# Patient Record
Sex: FEMALE | Race: BLACK OR AFRICAN AMERICAN | Employment: OTHER | URBAN - METROPOLITAN AREA
[De-identification: names, ages, dates, MRNs, and addresses within clinical notes are randomized per-mention and may not be internally consistent; named-entity substitution may affect disease eponyms.]

---

## 2018-10-12 ENCOUNTER — HOSPITAL ENCOUNTER (OUTPATIENT)
Dept: PHYSICAL THERAPY | Age: 28
Discharge: HOME | End: 2018-10-12
Payer: OTHER GOVERNMENT

## 2018-10-12 VITALS — BODY MASS INDEX: 27.96 KG/M2 | WEIGHT: 157.8 LBS | HEIGHT: 63 IN

## 2018-10-12 PROCEDURE — 97162 PT EVAL MOD COMPLEX 30 MIN: CPT

## 2018-10-12 PROCEDURE — 97140 MANUAL THERAPY 1/> REGIONS: CPT

## 2018-10-12 PROCEDURE — 97110 THERAPEUTIC EXERCISES: CPT

## 2018-10-12 NOTE — PROGRESS NOTES
[facility] 
[facility] [address], [address] Teetee Candelario  22. INITIAL EVALUATION 
 
NAME: Albert Pineda AGE: 25 y.o. GENDER: female DATE: 10/12/2018 REFERRING PHYSICIAN: Dr. Eric Constantino HISTORY AND BACKGROUND:  
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 It was intermittent and now has become persistent. She received lymphedema care while she was in [facility] in 2014. She was treated with L thigh high MLB, MLD, exercise, and a pump with both LE garments. She has been receiving 2 pairs of Jobst Ultra Sheer, 20-30 mmHg, large, petite knee high stockings every 6 months from the [place]. Lymphedema Life Impact Scale: Score of 46 and impairment percentage of 68%. See scanned document. Past Medical History: No past medical history on file. No past surgical history on file. Per patient report:  Migraines, back problems, B LE lymphedema grade 4. Current Medications: No current outpatient prescriptions on file. No current facility-administered medications for this encounter. 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 Living Situation: lives alone, family nearby Trainable Caregiver?: family if needed Self-care/ADLs: I Mobility: I Sleeping Arrangement:  Sleeps in a bed Adaptive Equipment Owned: none  Other: n/a 
 Previous Therapy:  2014 in [facility] Compression/Lymphedema Equipment:  Pump, Jobst ultra sheer knee high stockings in CC1, size large, petite SUBJECTIVE:  
Pt reports that her functional level is limited by the sensation of tightness and pain in her legs and feet. Patients goals for therapy: \"to achieve normal size of my limbs and receive training on how to manage this long term. \" OBJECTIVE DATA SUMMARY:  
EXAMINATION/PRESENTATION/DECISION MAKING: Pain: 
Pain Scale 1: Numeric (0 - 10) Pain Intensity 1: 0 Patient Stated Pain Goal: 0 Self-Care and ADLs: 
Grooming: Independent  Bathing: Independent UB Dressing: Independent  LB Dressing: Independent Don/Doff Shoes/Socks: Independent  Toileting: Independent Other: Pt able to manage the pump herself Skin and Tissue Assessment: 
Dermal Status: 
[x]   Intact []  Dry  
[]  Tenuous [] Flaky  
[]  Wound/lesion present []  Scars  
[]  Dermatitis Texture/Consistency: 
[x]  Boggy-B LEs []  Pitting Edema  
[]  Brawny []  Combination []  Fibrotic/Woody Pigmentation/Color Change: 
[x]  Normal []  Hemosiderin  
[]  Red []  Erythematous []  Hyperpigmented []  Hyperlipodermatosclerosis Anomalies: 
[]  Lymphorrhea []  Vesicles []  Petechiae []  Warty Vercusis  
[]  Bullae []  Papilloma Circulatory: 
[]  ISABELLA []  Varicosities:  
[]  Pulses []  Vascular studies ruled out DVT in past  
[x]  Post-phlebitic syndrome Nails: 
[x]   Normal 
[]   Fungus Stemmers Sign: negative B dorsal feet Height:  Height: 5' 3\" (160 cm)  Weight:  Weight: 71.6 kg (157 lb 12.8 oz) BMI:  BMI (calculated): 28 
(36 or greater: adversely affecting lymphedema) Wound/Ulcer:  none      Wound Pain:   n/a Volumetric Measurements:  
Right:  11,152 mL Left:  11,069 mL  
% Difference: .74% Dominance: R  
(See scanned graph) Range of Motion: WNL Strength:  WNL Sensation:   
[x] Intact with some hypersensitivity [] Impaired Mobility:  Bed/Chair Mobility:  Independent Transfers:  Independent Sitting Balance:  normal Standing Balance:  Normal  
Gait:  Independent Wheelchair Mobility: not assessed Endurance:  good Stairs:  Not assessed Safety: 
Patient is alert and oriented:  yes Safety awareness:  good Fall Risk?:  low Patient given written fall prevention handout: Yes Precautions:  Standard lymphedema precautions to include avoiding blood pressure readings, injections and IVs or other procedures/acts that could lead to broken skin on affected area, and avoiding excessive heat, resistive activity or altitude without compression garment Functional Measure:  
Based on the above components, the patient evaluation is determined to be of the following complexity level: MEDIUM Evaluation Time: 8:25-9 am 
 
TREATMENT PROVIDED:  
1. Treatment description:  Therapeutic Exercise and Procedure: Patient instructed in activity restrictions and exercise guidelines. Therapist demonstrated deep abdominal breathing and a remedial lymphedema range of motion exercise program to be done in sitting and supine. Patient was able to complete the routine times 5 reps and deep abdominal breathing with fair performance. Patient has been asked to complete breathing exercises and the decongestive exercise routine daily. Pt was also asked to complete a daily walking program with the goal being up to 30 minutes per day. Treatment time:  9-9:15 am  Minutes: 15 
 
2. Treatment description:  Manual Therapy: Patient instructed in skin care principles and anatomy of the lymphatic system. Therapist able to demonstrate manual lymphatic drainage techniques for the drainage of B LEs and skin care protocol: lotion, heel balm.   Options in compression was discussed and included MLB, day stockings, night foam systems or velcro systems, and a vasopneumatice device that would also drain the trunk as well as the LEs. Pt wore in a pair of her stockings that she received from the [place] but the sizing is not appropriate at this time as she should be wearing a size medium in Jobst brand and not the large. Pt can continue wearing the stocking for now but not sure how much compression it is actually offering the patient. Treatment time:  9:15-9:45 am  Minutes: 30 
 
ASSESSMENT:  
Mike Kilgore is a 25 y.o. female who presents with secondary lymphedema with a possible primary component. Patient will benefit from complete decongestive therapy (CDT) including manual lymphatic drainage (MLD) technique, short-stretch textile bandages/compression system to decongest limb, and kinesiotaping as appropriate. Patient will receive instruction in proper skin care to recognize signs/symptoms of and prevent infection, therapeutic exercise, and self-MLD for independent home program and restorative lymphatic performance. This care is medically necessary due to the infection risk with lymphedema and to improve functional activities. CDT is necessary to resolve swelling to allow patient to return to wearing normal clothes/footwear and prevent worsening of symptoms, such as venous stasis ulcerations, infections, or hospitalizations. Patient will be independent with home program strategies to allow improved ADL ability and mobility and to allow patient to return to greatest functional independence. Rehabilitation potential is considered to be Good. Factors which may influence rehabilitation potential include active lifestyle, age, and her ability to complete skin care and reach her feet and toes. Patient will benefit from 6-12 physical therapy visits over 12 weeks to optimize improvement in these areas. PLAN OF CARE:  
Recommendations and Planned Interventions: 
Manual lymph drainage/complete decongestive therapy Multi-layer compression bandaging (short-stretch) Compression garment fitting/provision Lymphedema therapeutic exercise AROM/PROM/Strength/Coordination Self-care training Functional mobility training Education in skin care and lymphedema precautions Self-MLD education per home program 
Self-bandaging education per home program 
Caregiver education as needed Wound care as needed GOALS Short term goals Time frame: to meet by 11/15/2018 1. Patient will demonstrate knowledge of signs/symptoms of infections/cellulitis and be independent in skin care to prevent cellulitis. 2.  Patient will demonstrate independence in lymphedema home program of therapeutic exercises to improve circulation and decongest limb to improve ADLs. 3.  Patient will tolerate multi-layer bandages (MLB) and show measureable decrease in limb volume to allow ordering of home compression system (daytime, nighttime garments and pump as needed). Long term goals Time frame: to meet by 1/7/2019 1. Pt will show improvement in Lymphedema Life Impact Scale by decreasing impairment percentage to under 60% and thus allow improvement in patient's quality of life. 2.  Patient will be independent with don/doff of compression system and use in order to prevent reaccumulation of fluid at discharge. 3.  Pt will be independent in self-MLD and show stable limb volumes showing decongestion and pt. ready for transition to independent restorative phase of lymphedema therapy. Patient has participated in goal setting and agrees to work toward plan of care. Patient was instructed to call if questions or concerns arise. Thank you for this referral. 
Gurwinder Hernandez, PT Time Calculation: 80 mins TREATMENT PLAN EFFECTIVE DATES:  
10/12/2018 TO 1/7/2019 I have read the above plan of care for Jessica De Jesus. I certify the above prescribed services are required by this patient and are medically necessary.   The above plan of care has been developed in conjunction with the lymphedema/physical therapist.  
 
 
 Physician Signature: ____________________________________Date:______________ Dr. Peg Flood

## 2018-10-25 ENCOUNTER — HOSPITAL ENCOUNTER (OUTPATIENT)
Dept: PHYSICAL THERAPY | Age: 28
Discharge: HOME | End: 2018-10-25
Payer: OTHER GOVERNMENT

## 2018-10-25 PROCEDURE — 97140 MANUAL THERAPY 1/> REGIONS: CPT

## 2018-10-25 NOTE — PROGRESS NOTES
[facility] 
[facility] [address], [address] [place] Candel[place]  20. LYmphedema Therapy Visit: 2 [x]                  Daily note               []                 30 day/10th visit progress note NAME: Charmaine Pineda DATE: 10/25/2018 GOALS Short term goals Time frame: to meet by 11/15/2018 1. Patient will demonstrate knowledge of signs/symptoms of infections/cellulitis and be independent in skin care to prevent cellulitis. Education has begun. Pt has noticed an area on her R medial lower leg that is discolored and thus has done well to keep a close eye on the area for any further changes. 2.  Patient will demonstrate independence in lymphedema home program of therapeutic exercises to improve circulation and decongest limb to improve ADLs. Pt has been educated in the ROM exercise program. 
3.  Patient will tolerate multi-layer bandages (MLB) and show measureable decrease in limb volume to allow ordering of home compression system (daytime, nighttime garments and pump as needed). MLB was deferred prior to ordering the first set of day compression garments as her swelling has been down for the past two appointment because of being here in the early am.  Pt was able to show pictures on her phone of her feet and ankles at the end of the day and the swelling is very visible. Will proceed with ordering the first set of day garments. Have also begun the measuring for custom radha stacy and lower leg piece but would like to use another session to finalize the measurements prior to submitting to the [address] [place] [address] Road term goals Time frame: to meet by 1/7/2019 1. Pt will show improvement in Lymphedema Life Impact Scale by decreasing impairment percentage to under 60% and thus allow improvement in patient's quality of life.  
2.  Patient will be independent with don/doff of compression system and use in order to prevent reaccumulation of fluid at discharge. Pt is I with don and doff of the knee high stockings that she currently has from the [facility] but the stockings are not the appropriate size as they are a Jobst large in the ultra sheer fabric and pt is measuring into the size medium. They are safe for her to wear but they are not containing her at the end of the day. 3.  Pt will be independent in self-MLD and show stable limb volumes showing decongestion and pt. ready for transition to independent restorative phase of lymphedema therapy. Instruction was begun today in the self MLD packet for B LEs, primary sequence. SUBJECTIVE REPORT:  Pt able to perform the ROM remedial lymphedema exercise routine at home following the written handout. She is doing so in the am and she has more time then. She has been asked to try and to in the pm once more swelling has accumulated if she could. Pain: 
Pain Scale 1: Numeric (0 - 10) Pain Intensity 1: 0 Patient Stated Pain Goal: 0 Gait:   
[x]  Independent gait without any assistive device for community distances ADLs:  I Treatment Response:  
[x]   Patient reviewed packet received at evaluation 
[x]   Patient completed home program as prescribed 
[]   Patient not fully compliant with home program to date 
[]   Patient waiting on compression garment arrival 
Function:  
[]   Patient requiring less assistance with completion of home program 
[]   ADLs are requiring less assistance [x]   Patient able to return to work/leisure activities Lymphedema Life Impact Scale: deferred Weight: deferred TREATMENT AND OBJECTIVE DATA SUMMARY:  
Patient/Family Education:  
 
Educated in skin care: [x]   Skin care products [x]   Hygiene [x]  Prevention of cellulitis 
[]   Wound care Educated in exercise: [x]   Walking program 
[]   Polly ball routine 
[]   Stick routine [x]   ROM routine Instructed in self MLD:  
 Written sequence given and reviewed with patient as well as demonstration and instruction during MLD portion of the session. Instructed in don/doff of compression system:  
[]   Multi layer bandage (MLB) donning principles and wear precautions [x]   Day garments-options available 
[x]   Night garments-options available with pt deciding on the flexitouch and radha kumar with lower leg garments Therapeutic Activity 0 minutes Treatment time: n/a Functional Mobility: I Fall risk: low Therapeutic Exercise/Procedure 0 minutes Treatment time: n/a Polly ball exercise program:  deferred Free exercises/ROM: Demonstration by therapist of written routine on the evaluation. Home program: Patient to perform daily to BID: 
[x]   Skin care [x]   Deep abdominal breathing 
[x]   Exercise routine [x]   Walking program 
[x]   Rest in supine  
[]   Compression bandage 
[x]   Compression garments [x]   Vasopneumatic device trial in the clinic hopefully on her next visit  
[]   Wound care [x]   Self MLD 
[]   Bring supplies to each therapy visit 
[]   Purchase necessary supplies 
[]   Weight loss program 
[]   Follow up with Rationale: Exercise will increase the lymph angiomotoricity and tissue pressure of the skin and thus decrease swelling. Modalities 0 minutes Treatment time: n/a Vasopneumatic pump: Pt would benefit from a trial of the flexitouch. The unit was described to pt along with its use and benefits. Pt would like a demo. Manual Lymphatic Drainage (MLD) 90 minutes Treatment time: 8:20-9:50 am 
Area to decongest:   
[] UE 
        []  Right     []  Left      [] Trunk [x] LE   
         [x]  Right     [x]  Left      [x] Trunk Sequence used and effectiveness: [] Secondary/Primary sequence for upper extremity with / without trunk involvement 
 
[x] Primary sequence for lower extremities with trunk involvement completed with full L LE while pt in the supine position today. [] Modifications were made to manual lymph drainage sequence to exclude cervical techniques secondary to patient's age [x] Self MLD sequence/techniques/hand strokes Skin/wound care/debridement: Intact skin except there is an area that was noticed by the patient at home during skin care on the R medial lower leg that is mildly pink in color but that is a raised texture about 2 cms in diameter. Pt has been asked to continue using lotion in the area and to monitor the area. She may need to see a dermatologist or her PCP if it continues. No heat or tenderness noted in the area and pt has not had trauma to the area. Upper/Lower extremity compression: Deferred application of a multilayer compression bandage at this time as pt would benefit from an appropriate day compression garment and then a night foam system. Options were present to patient and she has decided on a pair of knee high stockings and the radha system (stacy and lower leg pieces). Kinesiotaping: deferred Girth/Volume measurement: Reviewed results of volumetric measurements taken on evaluation. Custom measurements were begun for the Radha products but final measurements will be obtained on her next visit prior to submitting the order for processing. TOTAL TREATMENT 90 mins Circumferential Limb Measurements: 
Affected Side: L>R Left (cm) Right (cm) Ankle 24   
 23.2 Calf   
 37   
 38.3 Mid Knee 42   
 42 Thigh 65   
 69.5 Length   
 43.5   
 43.5 ASSESSMENT:  
Treatment effectiveness and tolerance: Pt tolerated first full treatment session of MLD today well. Product selection has begun. Will set up a flexitouch demo for the next visit. Progress toward goals: See goal section for details. PLAN OF CARE:  
Changes to the plan of care: none Frequency: []  2 times a week [x]  Weekly []  Biweekly []  Monthly Other: n/a Deanna Rust PT, [employer]

## 2018-11-01 ENCOUNTER — HOSPITAL ENCOUNTER (OUTPATIENT)
Dept: PHYSICAL THERAPY | Age: 28
Discharge: HOME | End: 2018-11-01
Payer: OTHER GOVERNMENT

## 2018-11-01 PROCEDURE — 97016 VASOPNEUMATIC DEVICE THERAPY: CPT

## 2018-11-01 PROCEDURE — 97140 MANUAL THERAPY 1/> REGIONS: CPT

## 2018-11-01 NOTE — PROGRESS NOTES
[facility] 
Physical Therapy Lymphedema Clinic [address], [address] LYmphedema Therapy Visit: 3 [x]                  Daily note               []                 30 day/10th visit progress note NAME: Armani Pineda DATE: 11/1/2018 GOALS Short term goals Time frame: to meet by 11/15/2018 1. Patient will demonstrate knowledge of signs/symptoms of infections/cellulitis and be independent in skin care to prevent cellulitis. Education continued in skin care. Pt has noticed an area on her R medial lower leg that is discolored and thus has done well to keep a close eye on the area for any further changes. 2.  Patient will demonstrate independence in lymphedema home program of therapeutic exercises to improve circulation and decongest limb to improve ADLs. Pt has been educated in the ROM exercise program. Will progress exercises on upcoming visits. 3.  Patient will tolerate multi-layer bandages (MLB) and show measureable decrease in limb volume to allow ordering of home compression system (daytime, nighttime garments and pump as needed). Deferred MLB. Patient measured for daytime compression garments (ready made knee highs) and also measured for custom radha stacy and lower leg pieces. Patient's order needs to be placed with the prosthetics department at the [address] [place] [address] so order will be placed per protocol and patient to receive the garments and have fitting in our clinic. Patient also able to have her pump trial today of the Flexitouch Plus in the clinic and this information will be sent to [facility] for processing. All garments have been measured for and the ordering process has begun.     
  
Long term goals Time frame: to meet by 1/7/2019 1. Pt will show improvement in Lymphedema Life Impact Scale by decreasing impairment percentage to under 60% and thus allow improvement in patient's quality of life. Deferred 2.  Patient will be independent with don/doff of compression system and use in order to prevent reaccumulation of fluid at discharge. Pt is I with don and doff of the knee high stockings that she currently has from the [place] but the stockings are not the appropriate size as they are a Jobst large in the ultra sheer fabric and pt is measuring into the size medium. They are safe for her to wear but they are not containing her at the end of the day. Will have to assess how she manages all of her new garments when they arrive. 3.  Pt will be independent in self-MLD and show stable limb volumes showing decongestion and pt. ready for transition to independent restorative phase of lymphedema therapy. Instruction continues in the self MLD packet for B LEs, primary sequence. Patient's girths taken today. Will continue to monitor volumes. SUBJECTIVE REPORT:  Patient able to be measured for all of her compression garments and have a vaso-pneumatic pump trial today. Pain:0/10 Gait:   
[x]  Independent gait without any assistive device for community distances ADLs:  I Treatment Response:  
[x]   Patient reviewed packet received at evaluation 
[x]   Patient completed home program as prescribed 
[]   Patient not fully compliant with home program to date [x]   Patient measured for compression garments today Function:  
[]   Patient requiring less assistance with completion of home program 
[]   ADLs are requiring less assistance [x]   Patient able to return to work/leisure activities Lymphedema Life Impact Scale: deferred Weight: deferred TREATMENT AND OBJECTIVE DATA SUMMARY:  
Patient/Family Education:  
 
Educated in skin care: [x]   Skin care products [x]   Hygiene [x]  Prevention of cellulitis 
[]   Wound care Educated in exercise: [x]   Walking program 
[]   Polly ball routine 
[]   Stick routine [x]   ROM routine Instructed in self MLD:  
 Written sequence given and reviewed with patient as well as demonstration and instruction during MLD portion of the session. Instructed in don/doff of compression system:  
[]   Multi layer bandage (MLB) donning principles and wear precautions [x]   Day garments-Jobst and Sigvaris knee highs will be ordered. See paper chart for details. [x]   Night garments-options available with pt deciding on the flexitouch and radha kumar with lower leg garments See paper chart for details. Therapeutic Activity 0 minutes Treatment time: n/a Functional Mobility: I Fall risk: low Therapeutic Exercise/Procedure0 minutes Treatment time: n/a Actual Experience exercise program: Deferred Free exercises/ROM: Deferred. Patient has written exercises to follow in the home. Home program: Patient to perform daily to BID: 
[x]   Skin care [x]   Deep abdominal breathing 
[x]   Exercise routine [x]   Walking program 
[x]   Rest in supine  
[]   Compression bandage 
[x]   Compression garments [x]   Vasopneumatic device trial in the clinic today 
[]   Wound care [x]   Self MLD [x]   Bring supplies to each therapy visit 
[]   Purchase necessary supplies 
[]   Weight loss program 
[]   Follow up with Rationale: Exercise will increase the lymph angiomotoricity and tissue pressure of the skin and thus decrease swelling. Modalities 60 minutes Treatment time: 8:30am-9:30am 
Vasopneumatic pump: Patient was able to have a full Flexitouch Plus trial today in the clinic for the L LE and Trunk. Patient tolerated the treatment well and had good results. Patient would like to proceed with ordering a vaso-pneumatic pump for home use. Manual Lymphatic Drainage (MLD) 70 minutes Treatment time: 5:71SE-8:64HP and 9:30am-10:15am 
Area to decongest:   
[] UE 
        []  Right     []  Left      [] Trunk [x] LE   
         [x]  Right     [x]  Left      [x] Trunk Sequence used and effectiveness: [] Secondary/Primary sequence for upper extremity with / without trunk involvement 
 
[x] Primary sequence for lower extremities with trunk involvement 
 
[] Modifications were made to manual lymph drainage sequence to exclude cervical techniques secondary to patient's age [x] Self MLD sequence/techniques/hand strokes Skin/wound care/debridement: Intact skin except there is an area that was noticed by the patient at home during skin care on the R medial lower leg that is mildly pink in color but that is a raised texture about 2 cms in diameter. Pt has been asked to continue using lotion in the area and to monitor the area. She may need to see a dermatologist or her PCP if it continues. No heat or tenderness noted in the area and pt has not had trauma to the area. Upper/Lower extremity compression: Deferred application of a multilayer compression bandage at this time as pt would benefit from an appropriate day compression garment and  night foam system. Options were present to patient and she has decided on a pair of knee high stockings and the radha system (stacy and lower leg pieces). All measurements were taken today and the order will be processed through the prosthetics department at the [address] [place] [address]. Kinesiotaping: Deferred Girth/Volume measurement: Girths taken today pre/post vaso-pneumatic pump trial. See below. TOTAL TREATMENT 130 mins Circumferential Limb Measurements: 
Affected Side: L>R Left (cm) Left  (cm) post pump trial  
Ankle 24.0   
23.8 Calf 38.0    
37.0 Mid Knee 41.8   
38.7 Thigh 54.0   
 54.7 Waist/Hips 80.5/113.0   
79.0/112.0 ASSESSMENT:  
Treatment effectiveness and tolerance: Patient able to have pump trial and have all garments measured today. [facility].  Patient pleased to be able to receive the care that she needs to address her lymphedema. Will continue to educate in home program on next visit. Progress toward goals: See goal section for details. PLAN OF CARE:  
Changes to the plan of care: Continue with plan of care established on evaluation. Frequency: []  2 times a week [x]  Weekly []  Biweekly []  Monthly Other: Will submit order to prosthetics at the [place] per protocol.    
 
 
Rodrigo Bateman, PTA, CLT

## 2018-11-08 ENCOUNTER — HOSPITAL ENCOUNTER (OUTPATIENT)
Dept: PHYSICAL THERAPY | Age: 28
Discharge: HOME | End: 2018-11-08
Payer: OTHER GOVERNMENT

## 2018-11-08 PROCEDURE — 97110 THERAPEUTIC EXERCISES: CPT

## 2018-11-08 PROCEDURE — 97140 MANUAL THERAPY 1/> REGIONS: CPT

## 2018-11-08 NOTE — PROGRESS NOTES
[facility] 
Physical Therapy Lymphedema Clinic [address] [place] [address], [address] LYmphedema Therapy Visit: 4 [x]                  Daily note               []                 30 day/10th visit progress note NAME: George Pineda DATE: 11/8/2018 GOALS Short term goals Time frame: to meet by 11/15/2018 1. Patient will demonstrate knowledge of signs/symptoms of infections/cellulitis and be independent in skin care to prevent cellulitis. Education is complete in skin care. Pt has noticed an area on her R medial lower leg that is pink and thus has been applying more lotion. She feels that is is a dry area that is responding to the lotion. 2.  Patient will demonstrate independence in lymphedema home program of therapeutic exercises to improve circulation and decongest limb to improve ADLs. Pt has been educated in the ROM exercise program and today in the [place] casey ball routine. 3.  Patient will tolerate multi-layer bandages (MLB) and show measureable decrease in limb volume to allow ordering of home compression system (daytime, nighttime garments and pump as needed). Deferred MLB. Patient measured for daytime compression garments (ready made knee highs) and also measured for custom radha stacy and lower leg pieces. Patient's order placed with the prosthetics department at the [facility] via her PCP at the [facility]. Patient completed her pump trial last visit of the Flexitouch Plus in the clinic and this information and thus awaiting the processing of that order as well. 
  
Long term goals Time frame: to meet by 1/7/2019 1. Pt will show improvement in Lymphedema Life Impact Scale by decreasing impairment percentage to under 60% and thus allow improvement in patient's quality of life. Pt scored 23 with 34% impairment and thus reach goal today, 11/8/18.  
2.  Patient will be independent with don/doff of compression system and use in order to prevent reaccumulation of fluid at discharge. Pt is I with don and doff of the knee high stockings that she currently has from the [place] but the stockings are not the appropriate size as they are a Jobst large in the ultra sheer fabric and pt is measuring into the size medium. They are safe for her to wear but they are not containing her at the end of the day. Will have to assess how she manages all of her new garments when they arrive. 3.  Pt will be independent in self-MLD and show stable limb volumes showing decongestion and pt. ready for transition to independent restorative phase of lymphedema therapy. Instruction continues in the self MLD packet for B LEs, primary sequence. Volumes are higher today as pt needs compression garments along with the MLD. SUBJECTIVE REPORT:  [facility] and [facility]. Pt is feeling some discomfort in her legs that is fairly new for her. Pain:1/10, very mild discomfort in her lower legs today. Gait:   
[x]  Independent gait without any assistive device for community distances ADLs:  I Treatment Response:  
[x]   Patient reviewed packet received at evaluation 
[x]   Patient completed home program as prescribed 
[]   Patient not fully compliant with home program to date [x]   Patient measured for compression garments and the order has been sent to the [place] Function:  
[]   Patient requiring less assistance with completion of home program 
[]   ADLs are requiring less assistance [x]   Patient able to return to work/leisure activities Lymphedema Life Impact Scale: Pt scored 23 with 34% today. Weight: deferred TREATMENT AND OBJECTIVE DATA SUMMARY:  
Patient/Family Education:  
 
Educated in skin care: [x]   Skin care products [x]   Hygiene [x]  Prevention of cellulitis 
[]   Wound care Educated in exercise: [x]   Walking program 
[x]   Polly ball routine 
[]   Stick routine [x]   ROM routine Instructed in self MLD:  
Demonstration and instruction during MLD portion of the session. Instructed in don/doff of compression system:  
[]   Multi layer bandage (MLB) donning principles and wear precautions [x]   Day garments-Jobst and Sigvaris knee highs will be ordered. See paper chart for details. [x]   Night garments-options available with pt deciding on the flexitouch and radha kumar with lower leg garments See paper chart for details. Therapeutic Activity 0 minutes Treatment time: n/a Functional Mobility: I Fall risk: low Therapeutic Exercise/Procedure 30 minutes Treatment time: 9:10-9:40 am 
[employer] exercise program: Pt instructed in the [facility] [employer] exercise program today to be done in the supine and sitting postures. Pt able to perform 5 reps of each. Free exercises/ROM:  Patient has written ROM exercises to follow in the home and has been encouraged to walk daily for 30 minutes. Pt performed additional ROM of her LEs for therapist to take volumetric measurements of each LE. Home program: Patient to perform daily to BID: 
[x]   Skin care [x]   Deep abdominal breathing 
[x]   Exercise routine [x]   Walking program 
[x]   Rest in supine  
[]   Compression bandage 
[x]   Compression garments [x]   Vasopneumatic device trial in the clinic today 
[]   Wound care [x]   Self MLD [x]   Bring supplies to each therapy visit 
[]   Purchase necessary supplies 
[]   Weight loss program 
[]   Follow up with Rationale: Exercise will increase the lymph angiomotoricity and tissue pressure of the skin and thus decrease swelling. Modalities 0 minutes Treatment time: n/a Vasopneumatic pump: Patient completed the Flexitouch Plus trial in the clinic for the L LE and Trunk last week. Patient tolerated the treatment well and had good results. Patient is hoping to get one in the home. Manual Lymphatic Drainage (MLD) 60 minutes Treatment time: 8:10-9:10am 
Area to decongest:  
[x] LE   
         [x]  Right     [x]  Left      [x] Trunk Sequence used and effectiveness:  
 
[x] Primary sequence for lower extremities with trunk involvement 
 
[] Modifications were made to manual lymph drainage sequence to exclude cervical techniques secondary to patient's age [x] Self MLD sequence/techniques/hand strokes Skin/wound care/debridement: Intact skin except there for the area on the R medial lower leg that is mildly pink in color but no longer with a raised texture. It covers about a 2 cm diameter but there are areas in the middle that are not involved. Pt has been asked to continue using lotion in the area and to monitor the area. She may need to see a dermatologist or her PCP if it continues. No heat or tenderness noted in the area and pt has not had trauma to the area. Upper/Lower extremity compression: Deferred application of a multilayer compression bandage at this time as pt's compression day and night garments have been ordered through her PCP and subsequently thru the prosthetics department at the [place]. Pt continues to wear the jobst size large, sheer knee highs at this time. Kinesiotaping: Deferred Girth/Volume measurement: Volumes taken today and reveal gain of 661 ml in the R LE and a gain of 404 ml in the L LE since the evaluation on 10/12/18. TOTAL TREATMENT 90 mins Circumferential Limb Measurements: 
See volume section above. ASSESSMENT:  
Treatment effectiveness and tolerance: Patient is ready for day and night compression garments and also a vasopneumatic device in the home setting. Hoping all can be processed quickly so that pt can begin with compression on her LEs. Progress toward goals: See goal section for details. LTG 1 met today. PLAN OF CARE:  
Changes to the plan of care: Continue with plan of care established on evaluation. Frequency: []  2 times a week 
[]  Weekly [x]  Biweekly []  Monthly Other: Order to prosthetics at the [place] has been submitted and pt will follow up with a phone call. Aleksandr Mejia, PT, [employer]

## 2018-11-20 ENCOUNTER — HOSPITAL ENCOUNTER (OUTPATIENT)
Dept: PHYSICAL THERAPY | Age: 28
Discharge: HOME | End: 2018-11-20
Payer: OTHER GOVERNMENT

## 2018-11-20 PROCEDURE — 97140 MANUAL THERAPY 1/> REGIONS: CPT

## 2018-11-20 NOTE — PROGRESS NOTES
[facility] 
Physical Therapy Lymphedema Clinic [address], [address] [facility], [address] LYmphedema Therapy Visit: 5 [x]                  Daily note               []                 30 day/10th visit progress note NAME: Vimal Pineda DATE: 11/20/2018 GOALS Short term goals Time frame: to meet by 11/15/2018 1. Patient will demonstrate knowledge of signs/symptoms of infections/cellulitis and be independent in skin care to prevent cellulitis. Education is complete in skin care. Pt has noticed an area on her R medial lower leg that is pink and thus has been applying more lotion. She feels that is is a dry area that is responding to the lotion. Will continue to monitor. 2.  Patient will demonstrate independence in lymphedema home program of therapeutic exercises to improve circulation and decongest limb to improve ADLs. Patient has been educated in the ROM exercise program and knobbie casey ball routine to date. She is independent with her written handouts. Goal Met 11/20/18 3. Patient will tolerate multi-layer bandages (MLB) and show measureable decrease in limb volume to allow ordering of home compression system (daytime, nighttime garments and pump as needed). Deferred MLB. Patient measured for daytime compression garments (ready made knee highs) and also measured for custom radha stacy and lower leg pieces. Patient's order placed with the prosthetics department at the [facility] via her PCP at the [facility]. Patient has yet to receive her garments so [facility] and [facility] were contacted by primary therapist to try to find out why the order has not been placed. Patient reports that her Flexitouch Plus will arrive today and she will be trained on Monday. All products have been ordered, but have not arrived to date. Will continue to monitor.  
  
Long term goals Time frame: to meet by 1/7/2019 1.  Pt will show improvement in Lymphedema Life Impact Scale by decreasing impairment percentage to under 60% and thus allow improvement in patient's quality of life. Pt scored 23 with 34% impairment. Goal met 11/8/18. 2.  Patient will be independent with don/doff of compression system and use in order to prevent reaccumulation of fluid at discharge. Pt is I with don and doff of the knee high stockings that she currently has from the [place] but the stockings are not the appropriate size as they are a Jobst large in the ultra sheer fabric and pt is measuring into the size medium. They are safe for her to wear but they are not containing her at the end of the day. Will have to assess how she manages all of her new garments when they arrive. 3.  Pt will be independent in self-MLD and show stable limb volumes showing decongestion and pt. ready for transition to independent restorative phase of lymphedema therapy. Instruction continues in the self MLD packet for B LEs, primary sequence. Volumes not assessed today because patient did not wear in her knee highs and she does not have her new garments. Will assess next visit. SUBJECTIVE REPORT:  Patient called her PCP  to assist with processing the order for compression garments after last visit and from her understanding everything was taken care of and order placed. [facility] and call placed to referring physician to try to find out when patient will receive her garments. There has been some barriers with this patient's garment order which is delaying her progress towards goals. Patient reports that she will be traveling to [place] by car for Thanksgiving. Discussed strategies to reduce swelling with travel. \"My left foot/ankle usually gets really swollen when I travel. \" 
Patient agreeable to try kinesio-taping today to see if this well help. Pain:0/10 Gait:   
 [x]  Independent gait without any assistive device for community distances ADLs:  I Treatment Response:  
[x]   Patient reviewed packet received at evaluation 
[x]   Patient completed home program as prescribed 
[]   Patient not fully compliant with home program to date [x]   Patient measured for compression garments and the order has been sent to the [place], there has been a delay in order being processed so [facility] and [facility] office called by primary therapist.  
Function:  
[]   Patient requiring less assistance with completion of home program 
[]   ADLs are requiring less assistance [x]   Patient able to return to work/leisure activities Lymphedema Life Impact Scale: Pt scored 23 with 34% previously. Goal Met. Weight: Deferred TREATMENT AND OBJECTIVE DATA SUMMARY:  
Patient/Family Education:  
 
Educated in skin care: [x]   Skin care products [x]   Hygiene [x]  Prevention of cellulitis 
[]   Wound care Educated in exercise: [x]   Walking program 
[x]   Casey ball routine 
[]   Stick routine [x]   ROM routine Instructed in self MLD: Demonstration and instruction during MLD portion of the session. Patient has written handouts to follow. Instructed in don/doff of compression system:  
[]   Multi layer bandage (MLB) donning principles and wear precautions [x]   Day garments-Jobst and Sigvaris knee highs will be ordered. See paper chart for details. [x]   Night garments-options available with pt deciding on the flexitouch and radha stacy with lower leg garments See paper chart for details. Therapeutic Activity 0 minutes Treatment time: n/a Functional Mobility: Independent Fall risk: Low Therapeutic Exercise/Procedure0 minutes Treatment time: 0 Casey ball exercise program: Pt instructed in the roberto casey ball exercise program today to be done in the supine and sitting postures. Pt able to perform 5 reps of each. Free exercises/ROM: Patient has written ROM exercises to follow in the home and has been encouraged to walk daily for 30 minutes. Pt performed additional ROM of her LEs for therapist to take volumetric measurements of each LE. Home program: Patient to perform daily to BID: 
[x]   Skin care [x]   Deep abdominal breathing 
[x]   Exercise routine [x]   Walking program 
[x]   Rest in supine  
[]   Compression bandage 
[x]   Compression garments [x]   Vasopneumatic device trial in the clinic today 
[]   Wound care [x]   Self MLD [x]   Bring supplies to each therapy visit 
[]   Purchase necessary supplies 
[]   Weight loss program 
[]   Follow up with Rationale: Exercise will increase the lymph angiomotoricity and tissue pressure of the skin and thus decrease swelling. Modalities 0 minutes Treatment time: n/a Vasopneumatic pump: Patient reports that her Flexitouch Plus vaso-pneumatic pump is scheduled to arrive today. Patient set up for training on Monday. Manual Lymphatic Drainage (MLD)  80 minutes Treatment time: 11:10am-12:30pm 
Area to decongest:  
[x] LE   
         [x]  Right     [x]  Left      [x] Trunk Sequence used and effectiveness:  
[x] Primary sequence for lower extremities with trunk involvement 
 
[] Modifications were made to manual lymph drainage sequence to exclude cervical techniques secondary to patient's age [x] Self MLD sequence/techniques/hand strokes Patient has written handout to follow. Skin/wound care/debridement: Intact skin except there for the area on the R medial lower leg that is mildly pink in color but no longer with a raised texture. It covers about a 2 cm diameter but there are areas in the middle that are not involved. Pt has been asked to continue using lotion in the area and to monitor the area. She may need to see a dermatologist or her PCP if it continues. No heat or tenderness noted in the area and pt has not had trauma to the area. Upper/Lower extremity compression: Deferred application of a multilayer compression bandage at this time as pt's compression day and night garments have been ordered through her PCP and subsequently thru the prosthetics department at the [place]. [facility] and [facility] contacted today to find out about patient's order as garments were ordered several weeks ago. Patient has also contacted her PCP and is frustrated that there as been a delay. At this time we are having to wait for order to be filled and have very little control of order due to the process that needs to be done with insurance provider. Patient encouraged to continue with her home program, utilize her knee high compression and compression capris for her trip and if needed use kinesiotape that was provided to her today. Pt continues to wear the Jobst size large, sheer knee highs at this time, but did not have her garment on today when she arrived. Kinesiotaping: Patient reports that her L foot and ankle swell when she travels even with compression knee highs. Patient reports she does not have an allergy to taping material and agreeable to try the kinesio-tape for her trip. Two \"y\" strips were placed from base of toes to lateral lower leg at the knee and then one additional piece at the upper thigh medical to lateral. Patient instructed on tape use and how to remove safely. Patient given several pieces to use for her trip as needed. Girth/Volume measurement: Deferred today. TOTAL TREATMENT 80 mins Circumferential Limb Measurements: 
Deferred today. ASSESSMENT:  
Treatment effectiveness and tolerance: Patient is ready for day and night compression garments but there has been a delay with the process as we need to go through [place] prosthetics department to have order placed. Patient needs these products as soon as possible to reduce her swelling and to work toward her goals.  Will continue to work with [place] prosthetics and PCP on getting these garments to patient. Patient aware to contact the clinic when she receives her products for fitting/follow up. Patient plans to have her vaso-pneumatic pump delivered today and her training will occur on Monday. Patient traveling for the holiday so concerned that her swelling will worsen. She is aware to contact the clinic if needed to make an appointment if her swelling is worse when she returns home. Progress toward goals: See goal section for details. STG 2 and LTG 1 met. Will continue with remaining goals. PLAN OF CARE:  
Changes to the plan of care: Continue with plan of care established on evaluation. Frequency: []  2 times a week 
[]  Weekly [x]  Biweekly or when her garments arrive. []  Monthly Other: Order to prosthetics at the [place] has been submitted and pt followed up with a phone call, but the order has not been filled or placed. Primary therapist contacted both the PCP and [place] regarding this.    
 
 
Rodrigo Bateman, PTA, CLT

## 2018-12-11 ENCOUNTER — HOSPITAL ENCOUNTER (OUTPATIENT)
Dept: PHYSICAL THERAPY | Age: 28
Discharge: HOME | End: 2018-12-11
Payer: OTHER GOVERNMENT

## 2018-12-11 PROCEDURE — 97140 MANUAL THERAPY 1/> REGIONS: CPT

## 2018-12-11 NOTE — PROGRESS NOTES
[facility]  Physical Therapy Lymphedema Clinic  [address] [facility], [address]    LYmphedema Therapy  Visit: 6    []                  Daily note               [x]                 30 day Reassessment/ Progress note    NAME: Zulma Pineda  DATE: 12/11/2018    GOALS  Short term goals met:  2. Patient will demonstrate independence in lymphedema home program of therapeutic exercises to improve circulation and decongest limb to improve ADLs. Patient has been educated in the ROM exercise program and knobbie casey ball routine to date. She is independent with her written handouts. Goal Met 11/20/18  1. Patient will demonstrate knowledge of signs/symptoms of infections/cellulitis and be independent in skin care to prevent cellulitis. Patient has been educated in skin care and infection/cellulitis prevention. Patient independent with skin care at this time. Goal Met 12/11/18    Short term goals to meet by 11/15/2018 Extended to 1/7/19:  3. Patient will tolerate multi-layer bandages (MLB) and show measureable decrease in limb volume to allow ordering of home compression system (daytime, nighttime garments and pump as needed). Deferred MLB. Patient measured for daytime compression garments (ready made knee highs) and also measured for custom radha stacy and lower leg pieces. Patient received her garments and they were fitted today. Initial fit is good and patient comfortable with her products. Patient received her Flexitouch Plus and training has occurred in the home. Will measure volumes next week after she has been able to work with all of her new products. Continue.     Long term goals met:  1. Pt will show improvement in Lymphedema Life Impact Scale by decreasing impairment percentage to under 60% and thus allow improvement in patient's quality of life. Pt scored 23 with 34% impairment. Goal met 11/8/18.   2.  Patient will be independent with don/doff of compression system and use in order to prevent reaccumulation of fluid at discharge. Patient able today to demonstrate that she could don/doff all her new day and night compression systems. Patient purchased donning gloves to assist with donning knee highs. Goal Met 12/11/18    Long term goals to meet by 1/7/2019:  3. Pt will be independent in self-MLD and show stable limb volumes showing decongestion and pt. ready for transition to independent restorative phase of lymphedema therapy. Patient has been educated in self MLD packet for B LEs, primary sequence. Volumes not assessed today because patient just received all of her products. Will have patient work with all of her products and return in one week to have them assessed. SUBJECTIVE REPORT:  Patient received all of her day and night products from the [place]. Patient able to be fitted with all today and she is pleased with her products. Patient reports that she did not swell as much as she usually does on her trip to [place] at [facility] and can see the benefit of CDT that she has learned so far. Patient eager to begin using all of her new products. Pain:0/10  Gait:    [x]  Independent gait without any assistive device for community distances  ADLs:  I  Treatment Response:   [x]   Patient reviewed packet received at evaluation  [x]   Patient completed home program as prescribed  []   Patient not fully compliant with home program to date  [x]   Patient fitted with her new compression day and night time products. Function:   []   Patient requiring less assistance with completion of home program  []   ADLs are requiring less assistance   [x]   Patient able to return to work/leisure activities  Lymphedema Life Impact Scale: Pt scored 7 with 10% impairment on the LLIS. Initial evaluation score on 10/12/18 was 46 with a 68% impairment.    Weight: Deferred  TREATMENT AND OBJECTIVE DATA SUMMARY:   Patient/Family Education:      Educated in skin care: [x]   Skin care products  [x]   Hygiene  [x]  Prevention of cellulitis  []   Wound care     Educated in exercise: [x]   Walking program  [x]   Polly ball routine  []   Stick routine  [x]   ROM routine     Instructed in self MLD: Demonstration and instruction during MLD portion of the session. Patient has written handouts to follow. Instructed in don/doff of compression system:   []   Multi layer bandage (MLB) donning principles and wear precautions  [x]   Day garments- Received Jobst and Sigvaris knee highs and educated in wear and care of garments. [x]   Night garments- Received Flexitouch and radha stacy with lower leg garments and educated in wear and care of garments. Therapeutic Activity 0 minutes   Treatment time: n/a  Functional Mobility: Independent   Fall risk: Low     Therapeutic Exercise/Procedure 0 minutes   Treatment time: 0  [employer] exercise program: Patient has been educated in the [employer] routine on previous visits. Free exercises/ROM: Patient has written ROM exercises to follow in the home and has been encouraged to walk daily for 30 minutes. Home program: Patient to perform daily to BID:  [x]   Skin care  [x]   Deep abdominal breathing  [x]   Exercise routine  [x]   Walking program  [x]   Rest in supine   []   Compression bandage  [x]   Compression garments wear day and night products as recommended  [x]   Vasopneumatic device using daily in the home  []   Wound care  [x]   Self MLD  [x]   Bring supplies to each therapy visit  []   Purchase necessary supplies  []   Weight loss program  []   Follow up with    Rationale: Exercise will increase the lymph angiomotoricity and tissue pressure of the skin and thus decrease swelling. Modalities 0 minutes   Treatment time: n/a    Vasopneumatic pump: Patient now with her Flexitouch Plus vaso-pneumatic pump in place at home and is using daily as recommended.      Manual Lymphatic Drainage (MLD)  80 minutes   Treatment time: 12:35-1:30  Area to decongest:   [x] LE             [x]  Right     [x]  Left      [x] Trunk     Sequence used and effectiveness:   [x] Primary sequence for lower extremities with trunk involvement    [] Modifications were made to manual lymph drainage sequence to exclude cervical techniques secondary to patient's age    [x] Self MLD sequence/techniques/hand strokes  Patient has written handout to follow. Skin/wound care/debridement: Intact today. Some dryness noted lower legs due to weather conditions. Upper/Lower extremity compression: Deferred application of a multilayer compression bandage at this time as pt's compression day and night garments were ordered through her PCP and subsequently thru the prosthetics department at the [place]. Patient wore in her new Jobst Opaque Knee Highs 20-30 mmHg in black size M. Patient has not washed garments to date so they presented as slightly long. Discussed daily laundering of garments in order to keep their shape. Patient was fitted with all of her Custom Jeancarlos Oleg Items: Custom Boxer Azucena and RLE and LLE AD garments with Jeancarlos jackets for all. Patient educated on the wear and care of the products and also how to utilize the garments in the beginning to build tolerance. Patient able to demonstrate that she could don and doff independently and was issues dycem for home use. Patient was fitted with her Sigvaris 842C Soft Opaque  knee highs 20-30 mmHg in Medium/ Color Expresso. She reports that she feels more comfortable in these knee highs so we will see how she does over the next week and if additional knee highs can be ordered she may prefer these. Educated patient on how to care for her knee highs and wearing schedule. Encouraged her to alterate garments daily. Patient also shown the benefit of donning gloves and she was able to purchase a pair from [employer] today. Kinesiotaping: Deferred today.     Girth/Volume measurement: Deferred today and will assess when she returns next week after wearing her day and night garments for one week. TOTAL TREATMENT 55 mins     Circumferential Limb Measurements:  Deferred today. ASSESSMENT:   Treatment effectiveness and tolerance: Patient now has received all of her day and night products as well as her Flexitouch Plus vaso-pneumatic pump. The initial fit of all of the items is good. Patient to work with the products for one week and then return for volumes to be assessed and to see if she is having any issues/concerns. Expect that if patient is doing well we can proceed in trying to order her additional knee highs if she qualifies and prepare her for the restorative phase of care. There were issues with delay in receiving her garments, but now that she has them expect that she will meet remaining goals in the next 1-2 visits. Progress toward goals: See goal section for details. STG 1+ 2 and LTG 1+2 met. Will continue with remaining goals. PLAN OF CARE:   Changes to the plan of care: Continue with plan of care established on evaluation.     Frequency: []  2 times a week  [x]  Weekly  []  Biweekly   []  Monthly   Other:        Rodrigo NEWBY Patch, PTA, CLT  Rishi [employer], PT, [employer]

## 2019-05-07 NOTE — PROGRESS NOTES
Patient was treated in the [address] [place] [address] from 10/12/2018 - 2018. Patient received her compression garments and the vaso-pneumatic pump from the vendors but did not follow up for the fit and effectiveness of the garments and pump to be assessed. Since patient's plan of care  on 2019, she will be discharged from Lymphedema Services at this time.

## 2021-09-16 ENCOUNTER — APPOINTMENT (OUTPATIENT)
Dept: PHYSICAL THERAPY | Age: 31
End: 2021-09-16

## 2021-10-12 ENCOUNTER — HOSPITAL ENCOUNTER (EMERGENCY)
Age: 31
Discharge: HOME | End: 2021-10-12
Attending: EMERGENCY MEDICINE
Payer: OTHER GOVERNMENT

## 2021-10-12 VITALS
BODY MASS INDEX: 32.07 KG/M2 | OXYGEN SATURATION: 99 % | SYSTOLIC BLOOD PRESSURE: 119 MMHG | TEMPERATURE: 99 F | HEIGHT: 63 IN | HEART RATE: 110 BPM | RESPIRATION RATE: 18 BRPM | DIASTOLIC BLOOD PRESSURE: 72 MMHG | WEIGHT: 181 LBS

## 2021-10-12 DIAGNOSIS — R10.84 ABDOMINAL PAIN, GENERALIZED: Primary | ICD-10-CM

## 2021-10-12 DIAGNOSIS — R19.7 DIARRHEA OF PRESUMED INFECTIOUS ORIGIN: ICD-10-CM

## 2021-10-12 LAB
ALBUMIN SERPL-MCNC: 3 G/DL (ref 3.5–5)
ALBUMIN/GLOB SERPL: 0.6 {RATIO} (ref 1.1–2.2)
ALP SERPL-CCNC: 93 U/L (ref 45–117)
ALT SERPL-CCNC: 18 U/L (ref 12–78)
ANION GAP SERPL CALC-SCNC: 7 MMOL/L (ref 5–15)
APPEARANCE UR: ABNORMAL
AST SERPL-CCNC: 16 U/L (ref 15–37)
BACTERIA URNS QL MICRO: ABNORMAL /HPF
BASOPHILS # BLD: 0 K/UL (ref 0–0.1)
BASOPHILS NFR BLD: 0 % (ref 0–1)
BILIRUB SERPL-MCNC: 0.3 MG/DL (ref 0.2–1)
BILIRUB UR QL: NEGATIVE
BUN SERPL-MCNC: 11 MG/DL (ref 6–20)
BUN/CREAT SERPL: 13 (ref 12–20)
CALCIUM SERPL-MCNC: 8.7 MG/DL (ref 8.5–10.1)
CHLORIDE SERPL-SCNC: 104 MMOL/L (ref 97–108)
CO2 SERPL-SCNC: 28 MMOL/L (ref 21–32)
COLOR UR: ABNORMAL
CREAT SERPL-MCNC: 0.85 MG/DL (ref 0.55–1.02)
DIFFERENTIAL METHOD BLD: NORMAL
EOSINOPHIL # BLD: 0.1 K/UL (ref 0–0.4)
EOSINOPHIL NFR BLD: 2 % (ref 0–7)
EPITH CASTS URNS QL MICRO: ABNORMAL /LPF
ERYTHROCYTE [DISTWIDTH] IN BLOOD BY AUTOMATED COUNT: 13.6 % (ref 11.5–14.5)
GLOBULIN SER CALC-MCNC: 4.9 G/DL (ref 2–4)
GLUCOSE SERPL-MCNC: 80 MG/DL (ref 65–100)
GLUCOSE UR STRIP.AUTO-MCNC: NEGATIVE MG/DL
HCT VFR BLD AUTO: 40.6 % (ref 35–47)
HGB BLD-MCNC: 13.5 G/DL (ref 11.5–16)
HGB UR QL STRIP: NEGATIVE
IMM GRANULOCYTES # BLD AUTO: 0 K/UL (ref 0–0.04)
IMM GRANULOCYTES NFR BLD AUTO: 0 % (ref 0–0.5)
KETONES UR QL STRIP.AUTO: NEGATIVE MG/DL
LEUKOCYTE ESTERASE UR QL STRIP.AUTO: ABNORMAL
LIPASE SERPL-CCNC: 86 U/L (ref 73–393)
LYMPHOCYTES # BLD: 1.6 K/UL (ref 0.8–3.5)
LYMPHOCYTES NFR BLD: 24 % (ref 12–49)
MCH RBC QN AUTO: 29.4 PG (ref 26–34)
MCHC RBC AUTO-ENTMCNC: 33.3 G/DL (ref 30–36.5)
MCV RBC AUTO: 88.5 FL (ref 80–99)
MONOCYTES # BLD: 0.7 K/UL (ref 0–1)
MONOCYTES NFR BLD: 10 % (ref 5–13)
NEUTS SEG # BLD: 4.4 K/UL (ref 1.8–8)
NEUTS SEG NFR BLD: 64 % (ref 32–75)
NITRITE UR QL STRIP.AUTO: NEGATIVE
NRBC # BLD: 0 K/UL (ref 0–0.01)
NRBC BLD-RTO: 0 PER 100 WBC
PH UR STRIP: 6.5 [PH] (ref 5–8)
PLATELET # BLD AUTO: 224 K/UL (ref 150–400)
PMV BLD AUTO: 9.8 FL (ref 8.9–12.9)
POTASSIUM SERPL-SCNC: 3.4 MMOL/L (ref 3.5–5.1)
PROT SERPL-MCNC: 7.9 G/DL (ref 6.4–8.2)
PROT UR STRIP-MCNC: NEGATIVE MG/DL
RBC # BLD AUTO: 4.59 M/UL (ref 3.8–5.2)
RBC #/AREA URNS HPF: ABNORMAL /HPF (ref 0–5)
SODIUM SERPL-SCNC: 139 MMOL/L (ref 136–145)
SP GR UR REFRACTOMETRY: 1.03 (ref 1–1.03)
UA: UC IF INDICATED,UAUC: ABNORMAL
UROBILINOGEN UR QL STRIP.AUTO: 1 EU/DL (ref 0.2–1)
WBC # BLD AUTO: 6.9 K/UL (ref 3.6–11)
WBC URNS QL MICRO: ABNORMAL /HPF (ref 0–4)

## 2021-10-12 PROCEDURE — 83690 ASSAY OF LIPASE: CPT

## 2021-10-12 PROCEDURE — 96374 THER/PROPH/DIAG INJ IV PUSH: CPT

## 2021-10-12 PROCEDURE — 99283 EMERGENCY DEPT VISIT LOW MDM: CPT

## 2021-10-12 PROCEDURE — 81001 URINALYSIS AUTO W/SCOPE: CPT

## 2021-10-12 PROCEDURE — 80053 COMPREHEN METABOLIC PANEL: CPT

## 2021-10-12 PROCEDURE — 85025 COMPLETE CBC W/AUTO DIFF WBC: CPT

## 2021-10-12 PROCEDURE — 74011250637 HC RX REV CODE- 250/637: Performed by: EMERGENCY MEDICINE

## 2021-10-12 PROCEDURE — 36415 COLL VENOUS BLD VENIPUNCTURE: CPT

## 2021-10-12 PROCEDURE — 74011250636 HC RX REV CODE- 250/636: Performed by: EMERGENCY MEDICINE

## 2021-10-12 RX ORDER — DICYCLOMINE HYDROCHLORIDE 20 MG/1
20 TABLET ORAL
Qty: 28 TABLET | Refills: 0 | Status: SHIPPED | OUTPATIENT
Start: 2021-10-12 | End: 2022-01-06

## 2021-10-12 RX ORDER — KETOROLAC TROMETHAMINE 30 MG/ML
30 INJECTION, SOLUTION INTRAMUSCULAR; INTRAVENOUS
Status: COMPLETED | OUTPATIENT
Start: 2021-10-12 | End: 2021-10-12

## 2021-10-12 RX ORDER — CIPROFLOXACIN 500 MG/1
750 TABLET ORAL
Status: COMPLETED | OUTPATIENT
Start: 2021-10-12 | End: 2021-10-12

## 2021-10-12 RX ADMIN — CIPROFLOXACIN 750 MG: 500 TABLET, FILM COATED ORAL at 22:46

## 2021-10-12 RX ADMIN — KETOROLAC TROMETHAMINE 30 MG: 30 INJECTION, SOLUTION INTRAMUSCULAR; INTRAVENOUS at 21:22

## 2021-10-12 RX ADMIN — SODIUM CHLORIDE 1000 ML: 9 INJECTION, SOLUTION INTRAVENOUS at 21:22

## 2021-10-13 NOTE — ED PROVIDER NOTES
EMERGENCY DEPARTMENT HISTORY AND PHYSICAL EXAM      Date: 10/12/2021  Patient Name: Gilda Pineda    History of Presenting Illness     Chief Complaint   Patient presents with    Abdominal Pain       History Provided By: Patient    HPI: Gilda Pineda, 33 y.o. female presents to the ED with cc of abdominal pain and diarrhea.     33 YOF with no significant PMH presents to the ED with a chief complaint of abdominal pain and diarrhea. Patient recently returned from a trip to the [facility]. Patient reports symptoms began yesterday. Patient reports lower abdominal pain described as \"cramping pain\" is across her lower abdomen. She denies any urinary symptoms or vaginal bleeding or discharge. She reports multiple episodes of watery diarrhea. No fever. No nausea or vomiting. No history of intra-abdominal surgeries. Denies chest pain or shortness of breath. There are no other complaints, changes, or physical findings at this time. PCP: Rafat, MD Kate    No current facility-administered medications on file prior to encounter. No current outpatient medications on file prior to encounter. Past History     Past Medical History:  Reviewed, denies    Past Surgical History:  Reviewed, denies    Family History:  No family history on file. Social History:  Social History     Tobacco Use    Smoking status: Never Smoker    Smokeless tobacco: Never Used   Substance Use Topics    Alcohol use: Yes     Comment: occ use    Drug use: Not Currently       Allergies:  No Known Allergies      Review of Systems   Review of Systems   Constitutional: Negative for activity change, chills and fever. HENT: Negative for facial swelling and voice change. Eyes: Negative for redness. Respiratory: Negative for cough, shortness of breath and wheezing. Cardiovascular: Negative for chest pain and leg swelling. Gastrointestinal: Positive for abdominal pain and diarrhea.  Negative for nausea and vomiting. Genitourinary: Negative for decreased urine volume. Musculoskeletal: Negative for gait problem. Skin: Negative for pallor and rash. Allergic/Immunologic: Negative for immunocompromised state. Neurological: Negative for tremors and facial asymmetry. Psychiatric/Behavioral: Negative for agitation. All other systems reviewed and are negative. Physical Exam   Physical Exam  Vitals and nursing note reviewed. Constitutional:       Comments: 57-year-old female, resting bed, no acute distress   HENT:      Head: Normocephalic and atraumatic. Cardiovascular:      Rate and Rhythm: Normal rate and regular rhythm. Heart sounds: No murmur heard. No friction rub. No gallop. Pulmonary:      Effort: Pulmonary effort is normal.      Breath sounds: Normal breath sounds. Abdominal:      Palpations: Abdomen is soft. Tenderness: There is abdominal tenderness in the right lower quadrant and left lower quadrant. There is no guarding or rebound. Musculoskeletal:         General: Normal range of motion. Cervical back: Normal range of motion. Skin:     General: Skin is warm. Capillary Refill: Capillary refill takes less than 2 seconds. Neurological:      General: No focal deficit present. Mental Status: She is alert.    Psychiatric:         Mood and Affect: Mood normal.         Diagnostic Study Results     Labs -     Recent Results (from the past 12 hour(s))   CBC WITH AUTOMATED DIFF    Collection Time: 10/12/21  9:23 PM   Result Value Ref Range    WBC 6.9 3.6 - 11.0 K/uL    RBC 4.59 3.80 - 5.20 M/uL    HGB 13.5 11.5 - 16.0 g/dL    HCT 40.6 35.0 - 47.0 %    MCV 88.5 80.0 - 99.0 FL    MCH 29.4 26.0 - 34.0 PG    MCHC 33.3 30.0 - 36.5 g/dL    RDW 13.6 11.5 - 14.5 %    PLATELET 257 315 - 503 K/uL    MPV 9.8 8.9 - 12.9 FL    NRBC 0.0 0  WBC    ABSOLUTE NRBC 0.00 0.00 - 0.01 K/uL    NEUTROPHILS 64 32 - 75 %    LYMPHOCYTES 24 12 - 49 %    MONOCYTES 10 5 - 13 % EOSINOPHILS 2 0 - 7 %    BASOPHILS 0 0 - 1 %    IMMATURE GRANULOCYTES 0 0.0 - 0.5 %    ABS. NEUTROPHILS 4.4 1.8 - 8.0 K/UL    ABS. LYMPHOCYTES 1.6 0.8 - 3.5 K/UL    ABS. MONOCYTES 0.7 0.0 - 1.0 K/UL    ABS. EOSINOPHILS 0.1 0.0 - 0.4 K/UL    ABS. BASOPHILS 0.0 0.0 - 0.1 K/UL    ABS. IMM. GRANS. 0.0 0.00 - 0.04 K/UL    DF AUTOMATED     METABOLIC PANEL, COMPREHENSIVE    Collection Time: 10/12/21  9:23 PM   Result Value Ref Range    Sodium 139 136 - 145 mmol/L    Potassium 3.4 (L) 3.5 - 5.1 mmol/L    Chloride 104 97 - 108 mmol/L    CO2 28 21 - 32 mmol/L    Anion gap 7 5 - 15 mmol/L    Glucose 80 65 - 100 mg/dL    BUN 11 6 - 20 MG/DL    Creatinine 0.85 0.55 - 1.02 MG/DL    BUN/Creatinine ratio 13 12 - 20      GFR est AA >60 >60 ml/min/1.73m2    GFR est non-AA >60 >60 ml/min/1.73m2    Calcium 8.7 8.5 - 10.1 MG/DL    Bilirubin, total 0.3 0.2 - 1.0 MG/DL    ALT (SGPT) 18 12 - 78 U/L    AST (SGOT) 16 15 - 37 U/L    Alk.  phosphatase 93 45 - 117 U/L    Protein, total 7.9 6.4 - 8.2 g/dL    Albumin 3.0 (L) 3.5 - 5.0 g/dL    Globulin 4.9 (H) 2.0 - 4.0 g/dL    A-G Ratio 0.6 (L) 1.1 - 2.2     LIPASE    Collection Time: 10/12/21  9:23 PM   Result Value Ref Range    Lipase 86 73 - 393 U/L   URINALYSIS W/ REFLEX CULTURE    Collection Time: 10/12/21  9:23 PM    Specimen: Urine   Result Value Ref Range    Color YELLOW/STRAW      Appearance CLOUDY (A) CLEAR      Specific gravity 1.030 1.003 - 1.030      pH (UA) 6.5 5.0 - 8.0      Protein Negative NEG mg/dL    Glucose Negative NEG mg/dL    Ketone Negative NEG mg/dL    Bilirubin Negative NEG      Blood Negative NEG      Urobilinogen 1.0 0.2 - 1.0 EU/dL    Nitrites Negative NEG      Leukocyte Esterase SMALL (A) NEG      WBC 5-10 0 - 4 /hpf    RBC 0-5 0 - 5 /hpf    Epithelial cells MANY (A) FEW /lpf    Bacteria 2+ (A) NEG /hpf    UA:UC IF INDICATED CULTURE NOT INDICATED BY UA RESULT CNI         Radiologic Studies -   No orders to display     CT Results  (Last 48 hours)    None        CXR Results  (Last 48 hours)    None          Medical Decision Making   I am the first provider for this patient. I reviewed the vital signs, available nursing notes, past medical history, past surgical history, family history and social history. Vital Signs-Reviewed the patient's vital signs. Patient Vitals for the past 12 hrs:   Temp Pulse Resp BP SpO2   10/12/21 2013 99 °F (37.2 °C) (!) 110 18 119/72 99 %       Records Reviewed: Nursing Notes and Old Medical Records    Provider Notes (Medical Decision Making):     Previously healthy 68-year-old female presents emergency department with lower abdominal pain. Vitals notable for mild tachycardia. Exam shows tenderness in the bilateral lower quadrants but no signs of peritonitis. We will check basic labs, fluids, check stool ova and parasites. We will also give pain medications. At this time not proceeding with CT, high most suspicious for Travelers diarrhea which can be treated with antibiotics. Lower suspicion for appendicitis given left lower quadrant tenderness. Doubt referred pelvic pathology. Doubt diverticulitis given age. ED Course:   Initial assessment performed. The patients presenting problems have been discussed, and they are in agreement with the care plan formulated and outlined with them. I have encouraged them to ask questions as they arise throughout their visit. ED Course as of Oct 12 2238   Tue Oct 12, 2021   2237 Patient's labs reviewed, CBC is unremarkable without leukocytosis or left shift, CMP negative. Lipase negative. Urinalysis unremarkable other than 2+ bacteria with epithelial cells, suspect contamination. Will discontinue ova parasites as patient is not had a bowel movement. No eosinophilia to make me suspect this. We will treat with 750 mg of Cipro x1. Bentyl for symptoms at home. Reassessed patient, patient states she feels \"much better. \"  Repeat abdominal exam is benign without signs of peritonitis. Strict return precautions discussed and patient expressed understanding.    [MB]      ED Course User Index  [MB] MD Jolene Duarte MD      Disposition:    Discharged    DISCHARGE PLAN:  1. Current Discharge Medication List      START taking these medications    Details   dicyclomine (BENTYL) 20 mg tablet Take 1 Tablet by mouth every six to eight (6-8) hours as needed for Abdominal Cramps. Qty: 28 Tablet, Refills: 0  Start date: 10/12/2021           2. Follow-up Information     Follow up With Specialties Details Why [address] EMERGENCY DEPT Emergency Medicine  If symptoms worsen [id number]        3. Return to ED if worse     Diagnosis     Clinical Impression:   1. Abdominal pain, generalized    2. Diarrhea of presumed infectious origin        Attestations:    Jolene Rodrigues MD    Please note that this dictation was completed with [employer], the computer voice recognition software. Quite often unanticipated grammatical, syntax, homophones, and other interpretive errors are inadvertently transcribed by the computer software. Please disregard these errors. Please excuse any errors that have escaped final proofreading. Thank you.

## 2021-10-13 NOTE — ED TRIAGE NOTES
Pt reporting generalized intermittent abd pain and diarrhea x yesterday after returning from [facility].  Denies N/V.

## 2021-10-13 NOTE — DISCHARGE INSTRUCTIONS
You were seen in the emergency department with your symptoms and given an antibiotic. Please drink plenty of fluids as well as bland foods such as rice, applesauce, toast and bananas. Please use the medicine Bentyl for pain and cramping. Return for worsening symptoms anytime.

## 2021-10-13 NOTE — ED NOTES
Emergency Department Nursing Plan of Care       The Nursing Plan of Care is developed from the Nursing assessment and Emergency Department Attending provider initial evaluation. The plan of care may be reviewed in the ED Provider note.     The Plan of Care was developed with the following considerations:   Patient / Family readiness to learn indicated by:verbalized understanding  Persons(s) to be included in education: patient  Barriers to Learning/Limitations:No    Signed     Kemar Fraire    10/12/2021   8:32 PM

## 2022-01-06 ENCOUNTER — HOSPITAL ENCOUNTER (EMERGENCY)
Age: 32
Discharge: HOME | End: 2022-01-06
Attending: EMERGENCY MEDICINE
Payer: OTHER GOVERNMENT

## 2022-01-06 VITALS
RESPIRATION RATE: 20 BRPM | HEIGHT: 63 IN | TEMPERATURE: 98.4 F | HEART RATE: 92 BPM | WEIGHT: 181 LBS | BODY MASS INDEX: 32.07 KG/M2 | SYSTOLIC BLOOD PRESSURE: 95 MMHG | DIASTOLIC BLOOD PRESSURE: 59 MMHG | OXYGEN SATURATION: 98 %

## 2022-01-06 DIAGNOSIS — J06.9 VIRAL UPPER RESPIRATORY TRACT INFECTION: Primary | ICD-10-CM

## 2022-01-06 DIAGNOSIS — Z20.822 SUSPECTED COVID-19 VIRUS INFECTION: ICD-10-CM

## 2022-01-06 LAB
FLUAV AG NPH QL IA: NEGATIVE
FLUBV AG NOSE QL IA: NEGATIVE

## 2022-01-06 PROCEDURE — 87804 INFLUENZA ASSAY W/OPTIC: CPT

## 2022-01-06 PROCEDURE — 99282 EMERGENCY DEPT VISIT SF MDM: CPT

## 2022-01-06 PROCEDURE — U0005 INFEC AGEN DETEC AMPLI PROBE: HCPCS

## 2022-01-06 RX ORDER — ACETAMINOPHEN 500 MG
1000 TABLET ORAL
Qty: 20 TABLET | Refills: 0 | Status: SHIPPED | OUTPATIENT
Start: 2022-01-06

## 2022-01-06 NOTE — ED NOTES
Emergency Department Nursing Plan of Care       The Nursing Plan of Care is developed from the Nursing assessment and Emergency Department Attending provider initial evaluation. The plan of care may be reviewed in the ED Provider note.     The Plan of Care was developed with the following considerations:   Patient / Family readiness to learn indicated by:verbalized understanding  Persons(s) to be included in education: patient  Barriers to Learning/Limitations:No    Signed     Benja Melara RN    1/6/2022   11:00 AM

## 2022-01-06 NOTE — ED PROVIDER NOTES
EMERGENCY DEPARTMENT HISTORY AND PHYSICAL EXAM      Date: 1/6/2022  Patient Name: Ciarra Pineda    History of Presenting Illness     Chief Complaint   Patient presents with    Concern For COVID-19 (Coronavirus)     History Provided By: Patient    HPI: Ciarra Pineda, 33 y.o. female with no chronic medical history who presents via self to the ED with cc of acute moderate aching sore throat, headache, body aches, chills, congestion, dry cough, subjective fever X 3 days. Denies any known sick contacts. She has not been vaccinated for Covid or flu. No chest pain, shortness of breath, wheezing, lightheadedness, dizziness, syncope, seizure, nausea, vomiting, diarrhea. Over-the-counter medications with mild relief of symptoms. PCP: Rafat, MD Kate    There are no other complaints, changes, or physical findings at this time. No current facility-administered medications on file prior to encounter. Current Outpatient Medications on File Prior to Encounter   Medication Sig Dispense Refill    [DISCONTINUED] dicyclomine (BENTYL) 20 mg tablet Take 1 Tablet by mouth every six to eight (6-8) hours as needed for Abdominal Cramps. 28 Tablet 0     Past History     Past Medical History:  History reviewed. No pertinent past medical history. Past Surgical History:  No past surgical history on file. Family History:  History reviewed. No pertinent family history. Social History:  Social History     Tobacco Use    Smoking status: Never Smoker    Smokeless tobacco: Never Used   Substance Use Topics    Alcohol use: Yes     Comment: occ use    Drug use: Not Currently     Allergies:  No Known Allergies  Review of Systems   Review of Systems   Constitutional: Positive for activity change, appetite change, chills, fatigue and fever. Negative for diaphoresis and unexpected weight change. HENT: Positive for congestion and rhinorrhea.  Negative for drooling, ear discharge, ear pain, facial swelling, postnasal drip, sinus pressure, sinus pain, sneezing, sore throat, trouble swallowing and voice change. Eyes: Negative for photophobia, pain, discharge and visual disturbance. Respiratory: Positive for cough. Negative for chest tightness, shortness of breath, wheezing and stridor. Cardiovascular: Negative for chest pain, palpitations and leg swelling. Gastrointestinal: Negative for abdominal pain, constipation, diarrhea, nausea and vomiting. Genitourinary: Negative. Negative for dysuria, flank pain, hematuria and urgency. Musculoskeletal: Positive for myalgias. Negative for arthralgias, back pain, gait problem, joint swelling, neck pain and neck stiffness. Skin: Negative. Negative for rash. Neurological: Positive for headaches. Negative for dizziness, seizures, syncope, speech difficulty, weakness, light-headedness and numbness. Psychiatric/Behavioral: Negative. Negative for confusion. Physical Exam   Physical Exam  Vitals and nursing note reviewed. Constitutional:       General: She is not in acute distress. Appearance: Normal appearance. She is well-developed. She is not ill-appearing, toxic-appearing or diaphoretic. HENT:      Head: Normocephalic and atraumatic. Jaw: There is normal jaw occlusion. Right Ear: Hearing, tympanic membrane, ear canal and external ear normal.      Left Ear: Hearing, tympanic membrane, ear canal and external ear normal.      Nose: Mucosal edema and congestion present. No rhinorrhea. Right Sinus: No maxillary sinus tenderness or frontal sinus tenderness. Left Sinus: No maxillary sinus tenderness or frontal sinus tenderness. Mouth/Throat:      Mouth: Mucous membranes are moist.      Pharynx: Uvula midline. No oropharyngeal exudate or posterior oropharyngeal erythema. Tonsils: No tonsillar abscesses. Eyes:      Conjunctiva/sclera: Conjunctivae normal.      Pupils: Pupils are equal, round, and reactive to light.    Cardiovascular: Rate and Rhythm: Normal rate and regular rhythm. Pulses: Normal pulses. Heart sounds: Normal heart sounds, S1 normal and S2 normal.   Pulmonary:      Effort: Pulmonary effort is normal. No tachypnea, accessory muscle usage or respiratory distress. Breath sounds: Normal breath sounds and air entry. No decreased breath sounds, wheezing, rhonchi or rales. Abdominal:      General: Bowel sounds are normal. There is no distension. Palpations: Abdomen is soft. Abdomen is not rigid. Tenderness: There is no abdominal tenderness. There is no right CVA tenderness, left CVA tenderness, guarding or rebound. Negative signs include Boateng's sign and McBurney's sign. Musculoskeletal:         General: Normal range of motion. Cervical back: Normal range of motion. Skin:     General: Skin is warm and dry. Coloration: Skin is not pale. Neurological:      General: No focal deficit present. Mental Status: She is alert and oriented to person, place, and time. She is not disoriented. GCS: GCS eye subscore is 4. GCS verbal subscore is 5. GCS motor subscore is 6. Cranial Nerves: No cranial nerve deficit. Sensory: No sensory deficit. Motor: No tremor or seizure activity. Psychiatric:         Mood and Affect: Mood normal.         Speech: Speech normal.         Behavior: Behavior normal.         Thought Content: Thought content normal.         Judgment: Judgment normal.       Diagnostic Study Results   Labs -     Recent Results (from the past 12 hour(s))   INFLUENZA A+B VIRAL AGS    Collection Time: 01/06/22 11:26 AM   Result Value Ref Range    Influenza A Antigen Negative NEG      Influenza B Antigen Negative NEG         Radiologic Studies -   No orders to display     No results found. Medical Decision Making   I am the first provider for this patient.     I reviewed the vital signs, available nursing notes, past medical history, past surgical history, family history and social history. Vital Signs-Reviewed the patient's vital signs. Patient Vitals for the past 24 hrs:   Temp Pulse Resp BP SpO2   01/06/22 1004 98.4 °F (36.9 °C) 92 20 (!) 95/59 98 %     Pulse Oximetry Analysis - 98% on RA (normal)    Records Reviewed: Nursing Notes, Old Medical Records, Previous Radiology Studies and Previous Laboratory Studies    Provider Notes (Medical Decision Making):   Patient presents with covid/flu-like symptoms including upper respiratory symptoms, myalgias, fever. DDx: COVID 19, Influenza, URI, PNA, bronchitis. Bilateral lung sounds clear to auscultation. Will obtain Covid and flu screening and treat symptoms as needed. No indication for further labs or imaging at this time. ED Course:   Initial assessment performed. The patients presenting problems have been discussed, and they are in agreement with the care plan formulated and outlined with them. I have encouraged them to ask questions as they arise throughout their visit. Progress Note:   Updated pt on all returned results and findings. Discussed the importance of proper follow up as referred below along with return precautions. Pt in agreement with the care plan and expresses agreement with and understanding of all items discussed. Disposition:  1130  I have discussed with patient their diagnosis, treatment, and follow up plan. The patient agrees to follow up as outlined in discharge paperwork and also to return to the ED with any worsening. Margret Spencer PA-C      PLAN:  1. Discharge Medication List as of 1/6/2022 11:21 AM        2.    Follow-up Information     Follow up With Specialties Details Why Alia De Jesus  Schedule an appointment as soon as possible for a visit in 1 week  [address] [place] [address],[address]    [facility] EMERGENCY DEPT Emergency Medicine Go to  As needed, If symptoms worsen [address] [facility]  [phone]        Return to ED if worse Diagnosis     Clinical Impression:   1. Viral upper respiratory tract infection    2. Suspected COVID-19 virus infection            Please note that this dictation was completed with Dragon, computer voice recognition software. Quite often unanticipated grammatical, syntax, homophones, and other interpretive errors are inadvertently transcribed by the computer software. Please disregard these errors. Additionally, please excuse any errors that have escaped final proofreading.

## 2022-01-06 NOTE — ED TRIAGE NOTES
Pt in with 3 days of sore throat, headache, body aches, chills. Denies known exposure to COVID or flu. Has not been vaccinated against COVID or flu.

## 2022-01-06 NOTE — ED NOTES
Discharge instructions were given to the patient by Alisia Mac RN. The patient left the Emergency Department ambulatory, alert and oriented and in no acute distress with 1 prescriptions. The patient was encouraged to call or return to the ED for worsening issues or problems and was encouraged to schedule a follow up appointment for continuing care. The patient verbalized understanding of discharge instructions and prescriptions, all questions were answered. The patient has no further concerns at this time.

## 2022-01-06 NOTE — DISCHARGE INSTRUCTIONS
It was a pleasure taking care of you at [facility] Emergency Department today. We know that when you come to [facility], you are entrusting us with your health, comfort, and safety. Our physicians and nurses honor that trust, and we truly appreciate the opportunity to care for you and your loved ones. We also value our feedback. If you receive a survey about your Emergency Department experience today, please fill it out. We care about our patients' feedback, and we listen to what you have to say. Thank you!

## 2022-01-09 LAB
SARS-COV-2, XPLCVT: DETECTED
SOURCE, COVRS: ABNORMAL

## 2023-02-16 ENCOUNTER — HOSPITAL ENCOUNTER (EMERGENCY)
Age: 33
Discharge: HOME | End: 2023-02-16
Attending: EMERGENCY MEDICINE
Payer: OTHER GOVERNMENT

## 2023-02-16 ENCOUNTER — APPOINTMENT (OUTPATIENT)
Dept: ULTRASOUND IMAGING | Age: 33
End: 2023-02-16
Attending: NURSE PRACTITIONER
Payer: OTHER GOVERNMENT

## 2023-02-16 VITALS
TEMPERATURE: 98.2 F | HEART RATE: 95 BPM | WEIGHT: 192 LBS | HEIGHT: 63 IN | OXYGEN SATURATION: 99 % | SYSTOLIC BLOOD PRESSURE: 116 MMHG | BODY MASS INDEX: 34.02 KG/M2 | DIASTOLIC BLOOD PRESSURE: 78 MMHG | RESPIRATION RATE: 18 BRPM

## 2023-02-16 DIAGNOSIS — R10.32 ABDOMINAL PAIN, LLQ (LEFT LOWER QUADRANT): Primary | ICD-10-CM

## 2023-02-16 LAB
ALBUMIN SERPL-MCNC: 3.1 G/DL (ref 3.5–5)
ALBUMIN/GLOB SERPL: 0.7 (ref 1.1–2.2)
ALP SERPL-CCNC: 105 U/L (ref 45–117)
ALT SERPL-CCNC: 17 U/L (ref 12–78)
ANION GAP SERPL CALC-SCNC: 5 MMOL/L (ref 5–15)
APPEARANCE UR: CLEAR
AST SERPL-CCNC: 17 U/L (ref 15–37)
BACTERIA URNS QL MICRO: NEGATIVE /HPF
BASOPHILS # BLD: 0.1 K/UL (ref 0–0.1)
BASOPHILS NFR BLD: 1 % (ref 0–1)
BILIRUB SERPL-MCNC: 0.3 MG/DL (ref 0.2–1)
BILIRUB UR QL: NEGATIVE
BUN SERPL-MCNC: 18 MG/DL (ref 6–20)
BUN/CREAT SERPL: 17 (ref 12–20)
CALCIUM SERPL-MCNC: 8.4 MG/DL (ref 8.5–10.1)
CHLORIDE SERPL-SCNC: 106 MMOL/L (ref 97–108)
CO2 SERPL-SCNC: 28 MMOL/L (ref 21–32)
COLOR UR: ABNORMAL
CREAT SERPL-MCNC: 1.04 MG/DL (ref 0.55–1.02)
DIFFERENTIAL METHOD BLD: NORMAL
EOSINOPHIL # BLD: 0.1 K/UL (ref 0–0.4)
EOSINOPHIL NFR BLD: 1 % (ref 0–7)
EPITH CASTS URNS QL MICRO: ABNORMAL /LPF
ERYTHROCYTE [DISTWIDTH] IN BLOOD BY AUTOMATED COUNT: 14.4 % (ref 11.5–14.5)
GLOBULIN SER CALC-MCNC: 4.7 G/DL (ref 2–4)
GLUCOSE SERPL-MCNC: 86 MG/DL (ref 65–100)
GLUCOSE UR STRIP.AUTO-MCNC: NEGATIVE MG/DL
HCG SERPL QL: NEGATIVE
HCG UR QL: NEGATIVE
HCT VFR BLD AUTO: 40.3 % (ref 35–47)
HGB BLD-MCNC: 13.1 G/DL (ref 11.5–16)
HGB UR QL STRIP: ABNORMAL
IMM GRANULOCYTES # BLD AUTO: 0 K/UL (ref 0–0.04)
IMM GRANULOCYTES NFR BLD AUTO: 0 % (ref 0–0.5)
KETONES UR QL STRIP.AUTO: NEGATIVE MG/DL
LEUKOCYTE ESTERASE UR QL STRIP.AUTO: NEGATIVE
LYMPHOCYTES # BLD: 1.6 K/UL (ref 0.8–3.5)
LYMPHOCYTES NFR BLD: 19 % (ref 12–49)
MCH RBC QN AUTO: 28.2 PG (ref 26–34)
MCHC RBC AUTO-ENTMCNC: 32.5 G/DL (ref 30–36.5)
MCV RBC AUTO: 86.9 FL (ref 80–99)
MONOCYTES # BLD: 0.8 K/UL (ref 0–1)
MONOCYTES NFR BLD: 9 % (ref 5–13)
NEUTS SEG # BLD: 5.9 K/UL (ref 1.8–8)
NEUTS SEG NFR BLD: 70 % (ref 32–75)
NITRITE UR QL STRIP.AUTO: NEGATIVE
NRBC # BLD: 0 K/UL (ref 0–0.01)
NRBC BLD-RTO: 0 PER 100 WBC
PH UR STRIP: 6.5 (ref 5–8)
PLATELET # BLD AUTO: 201 K/UL (ref 150–400)
PMV BLD AUTO: 9.7 FL (ref 8.9–12.9)
POTASSIUM SERPL-SCNC: 4.1 MMOL/L (ref 3.5–5.1)
PROT SERPL-MCNC: 7.8 G/DL (ref 6.4–8.2)
PROT UR STRIP-MCNC: NEGATIVE MG/DL
RBC # BLD AUTO: 4.64 M/UL (ref 3.8–5.2)
RBC #/AREA URNS HPF: ABNORMAL /HPF (ref 0–5)
SODIUM SERPL-SCNC: 139 MMOL/L (ref 136–145)
SP GR UR REFRACTOMETRY: 1.01
UA: UC IF INDICATED,UAUC: ABNORMAL
UROBILINOGEN UR QL STRIP.AUTO: 1 EU/DL (ref 0.2–1)
WBC # BLD AUTO: 8.5 K/UL (ref 3.6–11)
WBC URNS QL MICRO: ABNORMAL /HPF (ref 0–4)

## 2023-02-16 PROCEDURE — 36415 COLL VENOUS BLD VENIPUNCTURE: CPT

## 2023-02-16 PROCEDURE — 80053 COMPREHEN METABOLIC PANEL: CPT

## 2023-02-16 PROCEDURE — 76830 TRANSVAGINAL US NON-OB: CPT

## 2023-02-16 PROCEDURE — 81001 URINALYSIS AUTO W/SCOPE: CPT

## 2023-02-16 PROCEDURE — 84703 CHORIONIC GONADOTROPIN ASSAY: CPT

## 2023-02-16 PROCEDURE — 96374 THER/PROPH/DIAG INJ IV PUSH: CPT

## 2023-02-16 PROCEDURE — 76856 US EXAM PELVIC COMPLETE: CPT

## 2023-02-16 PROCEDURE — 74011250636 HC RX REV CODE- 250/636: Performed by: NURSE PRACTITIONER

## 2023-02-16 PROCEDURE — 81025 URINE PREGNANCY TEST: CPT

## 2023-02-16 PROCEDURE — 99284 EMERGENCY DEPT VISIT MOD MDM: CPT

## 2023-02-16 PROCEDURE — 85025 COMPLETE CBC W/AUTO DIFF WBC: CPT

## 2023-02-16 RX ORDER — KETOROLAC TROMETHAMINE 30 MG/ML
30 INJECTION, SOLUTION INTRAMUSCULAR; INTRAVENOUS
Status: COMPLETED | OUTPATIENT
Start: 2023-02-16 | End: 2023-02-16

## 2023-02-16 RX ORDER — KETOROLAC TROMETHAMINE 10 MG/1
10 TABLET, FILM COATED ORAL
Qty: 12 TABLET | Refills: 0 | Status: SHIPPED | OUTPATIENT
Start: 2023-02-16

## 2023-02-16 RX ADMIN — KETOROLAC TROMETHAMINE 30 MG: 30 INJECTION, SOLUTION INTRAMUSCULAR; INTRAVENOUS at 11:57

## 2023-02-16 NOTE — ED NOTES
Discharge instructions were given to the patient by Meka Alatorre RN. The patient left the Emergency Department ambulatory, alert and oriented and in no acute distress with 1 prescription. The patient was encouraged to call or return to the ED for worsening issues or problems and was encouraged to schedule a follow up appointment for continuing care. The patient verbalized understanding of discharge instructions and prescriptions, all questions were answered. The patient has no further concerns at this time.

## 2023-02-16 NOTE — ED NOTES

## 2023-02-16 NOTE — ED TRIAGE NOTES
Patient c/o intermittent lower left abdominal pain that started yesterday. Patient reports nausea, denies vomiting/diarrhea. Patient advises last bowel movement one day ago.

## 2023-02-16 NOTE — Clinical Note
[facility] EMERGENCY DEPT  [address] [facility] [id number] [phone]    Work/School Note    Date: 2/16/2023    To Whom It May concern:    Max Alejandra was seen and treated today in the emergency room by the following provider(s):  Attending Provider: Lonnie Neal MD  Nurse Practitioner: Ariela Alvarado NP. Max Alejandra is excused from work/school on 02/16/23 and 02/17/23. She is medically clear to return to work/school on 2/18/2023.        Sincerely,          Alis Jaime NP

## 2023-02-16 NOTE — DISCHARGE INSTRUCTIONS
It was a pleasure taking care of you at [facility] Emergency Department today. We know that when you come to [facility], you are entrusting us with your health, comfort, and safety. Our physicians and nurses honor that trust, and we truly appreciate the opportunity to care for you and your loved ones. We also value our feedback. If you receive a survey about your Emergency Department experience today, please fill it out. We care about our patients' feedback, and we listen to what you have to say. Thank you!

## 2023-02-16 NOTE — ED PROVIDER NOTES
[facility] EMERGENCY DEPT  EMERGENCY DEPARTMENT ENCOUNTER       Pt Name: Renan Pineda  MRN: [id number]  [place] 1990  Date of evaluation: 2023  Provider: Omkar Souza NP   PCP: Marixa Ariza MD  Note Started: 10:40 AM 23     CHIEF COMPLAINT       Chief Complaint   Patient presents with    Abdominal Pain        HISTORY OF PRESENT ILLNESS: 1 or more elements      History From: Patient  HPI Limitations : None     Michela Apgar is a 27 y.o. female who presents with abdominal pain. Onset today. Patient reports pain is in the left lower abdomen. Denies nausea, vomiting, fever, chills, constipation or diarrhea. Reports last bowel movement yesterday. She does report starting her menses yesterday. Patient states she has not had a menses in 3 months due to IUD insertion. Denies vaginal discharge, itching or odor prior to symptom onset. She has not taken anything for her pain. Denies history of ovarian cysts or uterine fibroids. Nursing Notes were all reviewed and agreed with or any disagreements were addressed in the HPI. REVIEW OF SYSTEMS      Review of Systems   Constitutional:  Negative for appetite change, chills, fatigue and fever. HENT:  Negative for congestion, ear pain and rhinorrhea. Eyes:  Negative for pain and itching. Respiratory:  Negative for cough, chest tightness, shortness of breath and wheezing. Cardiovascular:  Negative for chest pain, palpitations and leg swelling. Gastrointestinal:  Positive for abdominal pain. Negative for constipation, diarrhea, nausea and vomiting. Genitourinary:  Positive for vaginal bleeding. Negative for dysuria, frequency, urgency, vaginal discharge and vaginal pain. Musculoskeletal:  Negative for arthralgias, back pain and joint swelling. Skin:  Negative for color change and rash. Neurological:  Negative for dizziness, numbness and headaches. All other systems reviewed and are negative.      Positives and Pertinent negatives as per HPI. PAST HISTORY     Past Medical History:  History reviewed. No pertinent past medical history. Past Surgical History:  No past surgical history on file. Family History:  History reviewed. No pertinent family history. Social History:  Social History     Tobacco Use    Smoking status: Never    Smokeless tobacco: Never   Substance Use Topics    Alcohol use: Yes     Comment: occ use    Drug use: Not Currently       Allergies:  No Known Allergies    CURRENT MEDICATIONS      Discharge Medication List as of 2/16/2023  1:05 PM        CONTINUE these medications which have NOT CHANGED    Details   acetaminophen (TYLENOL) 500 mg tablet Take 2 Tablets by mouth every six (6) hours as needed for Pain., Normal, Disp-20 Tablet, R-0             PHYSICAL EXAM      ED Triage Vitals [02/16/23 1014]   ED Encounter Vitals Group      /78      Pulse (Heart Rate) 95      Resp Rate 18      Temp 98.2 °F (36.8 °C)      Temp src       O2 Sat (%) 99 %      Weight 192 lb      Height 5' 3\"        Physical Exam  Vitals and nursing note reviewed. Constitutional:       General: She is not in acute distress. Appearance: She is well-developed. She is not ill-appearing. HENT:      Head: Normocephalic and atraumatic. Right Ear: Tympanic membrane and ear canal normal.      Left Ear: Tympanic membrane and ear canal normal.      Nose: Nose normal.      Mouth/Throat:      Mouth: Mucous membranes are moist.      Pharynx: Oropharynx is clear. No posterior oropharyngeal erythema. Eyes:      Extraocular Movements: Extraocular movements intact. Conjunctiva/sclera: Conjunctivae normal.      Pupils: Pupils are equal, round, and reactive to light. Cardiovascular:      Rate and Rhythm: Normal rate and regular rhythm. Pulses: Normal pulses. Heart sounds: Normal heart sounds. Pulmonary:      Effort: Pulmonary effort is normal.      Breath sounds: Normal breath sounds.    Abdominal:      General: Bowel sounds are normal. There is no distension. Palpations: Abdomen is soft. There is no mass. Tenderness: There is abdominal tenderness in the left lower quadrant. There is no right CVA tenderness, left CVA tenderness, guarding or rebound. Musculoskeletal:      Cervical back: Normal range of motion and neck supple. Thoracic back: Normal.      Lumbar back: Spasms, tenderness and bony tenderness present. No deformity. Decreased range of motion. Skin:     General: Skin is warm and dry. Findings: No rash. Neurological:      Mental Status: She is alert and oriented to person, place, and time. GCS: GCS eye subscore is 4. GCS verbal subscore is 5. GCS motor subscore is 6. Psychiatric:         Thought Content: Thought content normal.        DIAGNOSTIC RESULTS   LABS:     Recent Results (from the past 12 hour(s))   METABOLIC PANEL, COMPREHENSIVE    Collection Time: 02/16/23 11:12 AM   Result Value Ref Range    Sodium 139 136 - 145 mmol/L    Potassium 4.1 3.5 - 5.1 mmol/L    Chloride 106 97 - 108 mmol/L    CO2 28 21 - 32 mmol/L    Anion gap 5 5 - 15 mmol/L    Glucose 86 65 - 100 mg/dL    BUN 18 6 - 20 MG/DL    Creatinine 1.04 (H) 0.55 - 1.02 MG/DL    BUN/Creatinine ratio 17 12 - 20      eGFR >60 >60 ml/min/1.73m2    Calcium 8.4 (L) 8.5 - 10.1 MG/DL    Bilirubin, total 0.3 0.2 - 1.0 MG/DL    ALT (SGPT) 17 12 - 78 U/L    AST (SGOT) 17 15 - 37 U/L    Alk.  phosphatase 105 45 - 117 U/L    Protein, total 7.8 6.4 - 8.2 g/dL    Albumin 3.1 (L) 3.5 - 5.0 g/dL    Globulin 4.7 (H) 2.0 - 4.0 g/dL    A-G Ratio 0.7 (L) 1.1 - 2.2     CBC WITH AUTOMATED DIFF    Collection Time: 02/16/23 11:12 AM   Result Value Ref Range    WBC 8.5 3.6 - 11.0 K/uL    RBC 4.64 3.80 - 5.20 M/uL    HGB 13.1 11.5 - 16.0 g/dL    HCT 40.3 35.0 - 47.0 %    MCV 86.9 80.0 - 99.0 FL    MCH 28.2 26.0 - 34.0 PG    MCHC 32.5 30.0 - 36.5 g/dL    RDW 14.4 11.5 - 14.5 %    PLATELET 757 285 - 623 K/uL    MPV 9.7 8.9 - 12.9 FL    NRBC 0.0 0  WBC    ABSOLUTE NRBC 0.00 0.00 - 0.01 K/uL    NEUTROPHILS 70 32 - 75 %    LYMPHOCYTES 19 12 - 49 %    MONOCYTES 9 5 - 13 %    EOSINOPHILS 1 0 - 7 %    BASOPHILS 1 0 - 1 %    IMMATURE GRANULOCYTES 0 0.0 - 0.5 %    ABS. NEUTROPHILS 5.9 1.8 - 8.0 K/UL    ABS. LYMPHOCYTES 1.6 0.8 - 3.5 K/UL    ABS. MONOCYTES 0.8 0.0 - 1.0 K/UL    ABS. EOSINOPHILS 0.1 0.0 - 0.4 K/UL    ABS. BASOPHILS 0.1 0.0 - 0.1 K/UL    ABS. IMM. GRANS. 0.0 0.00 - 0.04 K/UL    DF AUTOMATED     HCG QL SERUM    Collection Time: 02/16/23 11:12 AM   Result Value Ref Range    HCG, Ql. Negative NEG     HCG URINE, QL. - POC    Collection Time: 02/16/23 11:50 AM   Result Value Ref Range    Pregnancy test,urine (POC) Negative NEG     URINALYSIS W/ REFLEX CULTURE    Collection Time: 02/16/23 12:01 PM    Specimen: Urine   Result Value Ref Range    Color YELLOW/STRAW      Appearance CLEAR CLEAR      Specific gravity 1.015      pH (UA) 6.5 5.0 - 8.0      Protein Negative NEG mg/dL    Glucose Negative NEG mg/dL    Ketone Negative NEG mg/dL    Bilirubin Negative NEG      Blood MODERATE (A) NEG      Urobilinogen 1.0 0.2 - 1.0 EU/dL    Nitrites Negative NEG      Leukocyte Esterase Negative NEG      WBC 0-4 0 - 4 /hpf    RBC 10-20 0 - 5 /hpf    Epithelial cells MODERATE (A) FEW /lpf    Bacteria Negative NEG /hpf    UA:UC IF INDICATED CULTURE NOT INDICATED BY UA RESULT CNI          EKG: When ordered, EKG's are interpreted by the Emergency Department Physician in the absence of a cardiologist.  Please see their note for interpretation of EKG.       RADIOLOGY:  Non-plain film images such as CT, Ultrasound and MRI are read by the radiologist. Plain radiographic images are visualized and preliminarily interpreted by the ED Provider with the below findings:        Interpretation per the Radiologist below, if available at the time of this note:     US TRANSVAGINAL    Result Date: 2/16/2023  CLINICAL HISTORY: Left lower quadrant pain and vaginal bleeding Pelvic ultrasound: Realtime sonographic imaging of the pelvis was performed transabdominally. The uterus measures 6.7 x 3.7 x 2.8 cm and is normal in appearance. The endometrial stripe is obscured by intrauterine device which projects in satisfactory position. The ovaries are not visualized due to bowel gas. No free fluid. Intrauterine device satisfactory position. Nonvisualization of the ovaries. Correlation with transvaginal ultrasound will be performed. Transvaginal ultrasound: Realtime sonographic imaging of the pelvis was performed transvaginally. The uterus is normal in appearance. Intrauterine device projects in satisfactory position. The endometrial stripe measures 4 mm. . The right ovary is not visualized. The left ovary measures 2.1 x 1.6 x 1.5 cm. It is normal in appearance. Blood flow is documented within the left ovary. No free fluid. IMPRESSION: Intrauterine device satisfactory position. Nonvisualization of the right ovary. Otherwise unremarkable. US PELV NON OBS    Result Date: 2/16/2023  CLINICAL HISTORY: Left lower quadrant pain and vaginal bleeding Pelvic ultrasound: Realtime sonographic imaging of the pelvis was performed transabdominally. The uterus measures 6.7 x 3.7 x 2.8 cm and is normal in appearance. The endometrial stripe is obscured by intrauterine device which projects in satisfactory position. The ovaries are not visualized due to bowel gas. No free fluid. Intrauterine device satisfactory position. Nonvisualization of the ovaries. Correlation with transvaginal ultrasound will be performed. Transvaginal ultrasound: Realtime sonographic imaging of the pelvis was performed transvaginally. The uterus is normal in appearance. Intrauterine device projects in satisfactory position. The endometrial stripe measures 4 mm. . The right ovary is not visualized. The left ovary measures 2.1 x 1.6 x 1.5 cm. It is normal in appearance. Blood flow is documented within the left ovary. No free fluid. IMPRESSION: Intrauterine device satisfactory position. Nonvisualization of the right ovary. Otherwise unremarkable. PROCEDURES   Unless otherwise noted below, none  Procedures     CRITICAL CARE TIME   none  EMERGENCY DEPARTMENT COURSE and DIFFERENTIAL DIAGNOSIS/MDM   Vitals:    Vitals:    02/16/23 1014   BP: 116/78   Pulse: 95   Resp: 18   Temp: 98.2 °F (36.8 °C)   SpO2: 99%   Weight: 87.1 kg (192 lb)   Height: 5' 3\" (1.6 m)        Patient was given the following medications:  Medications   ketorolac (TORADOL) injection 30 mg (30 mg IntraVENous Given 2/16/23 1157)       CONSULTS: (Who and What was discussed)  None    Chronic Conditions: none    Social Determinants affecting Dx or Tx: None    Records Reviewed (source and summary of external records): Prior medical records, Previous Radiology studies, Previous Laboratory studies, and Nursing notes    CC/HPI Summary, DDx, ED Course, and Reassessment:  38-year-old female presenting with abdominal pain exhibiting tenderness to the left lower quadrant. Patient is holding her left lower abdomen and laying on her left side and pain. No fever on exam in addition to normal vital signs. Symptoms seem to have began when her menses started. Likely an ovarian or uterine pathology. I also suspect that this may be dysmenorrhea. Plan to obtain labs in addition will obtain ultrasound of pelvis. Differential diagnoses include UTI, dysmenorrhea ovarian cyst, uterine fibroid, ovarian torsion. Also suspecting an infectious however patient is currently bleeding and may obscure results of genital swabs. ED Course as of 02/16/23 1444   Thu Feb 16, 2023   1249 Progress Note:   Labs unremarkable and US shows IUD in good positioning. Also there is no ovarian or uterine pathology to cause LLQ pain. Patient reports pain resolved after Toradol.   Discussed obtaining CT of abdomen to evaluate for any further cause of left lower quadrant pain however with shared decision making patient opts for pain management at this time with discharge home. Discussed return precautions including worsening pain, fever, chills, vomiting, diarrhea. [NA]      ED Course User Index  [NA] Alis Jaime NP         FINAL IMPRESSION     1. Abdominal pain, LLQ (left lower quadrant)          DISPOSITION/PLAN   Discharged    Discharge Note: The patient is stable for discharge home. The signs, symptoms, diagnosis, and discharge instructions have been discussed, understanding conveyed, and agreed upon. The patient is to follow up as recommended or return to ER should their symptoms worsen. PATIENT REFERRED TO:  Follow-up Information       Follow up With Specialties Details Why [facility] EMERGENCY DEPT Emergency Medicine Call in 2 days As needed, If symptoms worsen [address] [facility]  [phone]              DISCHARGE MEDICATIONS:  Discharge Medication List as of 2/16/2023  1:05 PM        START taking these medications    Details   ketorolac (TORADOL) 10 mg tablet Take 1 Tablet by mouth every six (6) hours as needed for Pain., Normal, Disp-12 Tablet, R-0           CONTINUE these medications which have NOT CHANGED    Details   acetaminophen (TYLENOL) 500 mg tablet Take 2 Tablets by mouth every six (6) hours as needed for Pain., Normal, Disp-20 Tablet, R-0               DISCONTINUED MEDICATIONS:  Discharge Medication List as of 2/16/2023  1:05 PM          ED Attending Involvement : I have seen and evaluated the patient. My supervision physician was available for consultation. I am the Primary Clinician of Record. Alis Jaime NP (electronically signed)    (Please note that parts of this dictation were completed with voice recognition software. Quite often unanticipated grammatical, syntax, homophones, and other interpretive errors are inadvertently transcribed by the computer software. Please disregards these errors.  Please excuse any errors that have escaped final proofreading.)

## 2023-08-21 ENCOUNTER — TRANSCRIBE ORDERS (OUTPATIENT)
Facility: HOSPITAL | Age: 33
End: 2023-08-21

## 2023-08-21 DIAGNOSIS — I89.0 LYMPHEDEMA: Primary | ICD-10-CM

## 2023-11-06 ENCOUNTER — HOSPITAL ENCOUNTER (OUTPATIENT)
Facility: HOSPITAL | Age: 33
Setting detail: RECURRING SERIES
Discharge: HOME | End: 2023-11-09
Payer: OTHER GOVERNMENT

## 2023-11-06 PROCEDURE — 97760 ORTHOTIC MGMT&TRAING 1ST ENC: CPT

## 2023-11-06 PROCEDURE — 97162 PT EVAL MOD COMPLEX 30 MIN: CPT

## 2023-11-06 NOTE — THERAPY EVALUATION
Statement of Medical Necessity    Page 1 of 2900 Frida Falcon 1990 Today's Date: 11/6/2023 ASHTYN: [employer]   Payor: Mose Schwab / Plan: Mose Schwab / Product Type: *No Product type* /  ME: TBD  Refills: 2               Diagnosis  []   I97.2 Post-Mastectomy Lymphedema []   I87.2 Venous Insufficiency   []   I89.0 Lymphedema, other secondary  []   I83.019 Venous Stasis Ulcer LE, Right   []   I89.9 Unspecified Lymphatic Disorder []   I83.029 Venous Stasis Ulcer LE, Left   [x]   R60.9 Swelling not relieved by elevation [x]   Q82.0 Hereditary/ Congenital Lymphedema   []   C50.211 Malignant neoplasm of breast, Right []   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   []   C50.212 Malignant neoplasm of breast, Left []   L03.115 LE Cellulitis, Right   []    []   L03.116 LE Cellulitis, Left                                                           Humza So Falcon    1990  Page 2 of 2    Physician Order for DME for Diagnosis of Q82.0 as Listed on Statement of Medical Necessity, Page 1        Recommended Product:  Units Upper Extremity Rt Lt Units Lower Extremity Rt Lt    Circ-Aid, Ready Wrap, Sigvaris Arm    Inelastic binders (Circ-Aid, Farrow)  []Foot   []Below Knee   []Knee   []Thigh      Circ-Aid Ready Wrap, Sigvaris hand    Mitchell Dalton, night use []Full Leg  []Lower Leg      Tribute Arm, night use   2 Tribute, night use  [x]Full Leg  []Lower Leg x x    Mitchell Dalton Arm, night use    Chau Sleeve Leg/ Foot, night use      Gradiant Compression Sleeves & Gloves  []Custom [] RM Arm:  []CCL1 []CCL2 []CCL3  []Custom [] RM Glove: []CCL1 []CCL2 []CCL3     4 Gradient Compression Stockings   [x]Custom  []RM Lower Extremity:   []CCL1       [x]CCL2         []CCL3   []Knee       [x]Thigh        []Waist Length x x    Chau sleeve arm w/ hand, night use    Tribute Wrap, night use      Compression Bra          Compression Vest         The above patient was referred for treatment of Lymphedema due to the diagnosis indicated above.
bathing and skin care and continue 3-5x/week performance of Flexitouch pump sequence. [x] Progressed/Changed HEP based on: remedial exercise program with compression in place. [] positioning   [] Kinesiotape   [x] Skin care   [] wound care   [] other:   [x] Precautions. Patient / caregiver re-demonstrated bandaging. [] Yes  [x] No  Compression bandaging/garment precautions reviewed: [x] Yes  [] No      Skin assessment post-treatment (if applicable):    [x]  intact    []  redness- no adverse reaction                 []redness - adverse reaction:        Pain Level at end of session (0-10 scale): 5/10, LE heaviness, tightness      Plan of Care / Statement of Necessity for Lymphedema Therapy Services     Assessment / key information:  Patient is a 27 y/o female who presents with stage 2  lymphedema R/L  LE with trunk involvement. Onset of lymphedema with 2009 with presentation consistent with primary lymphedema. Patient will benefit from complete decongestive therapy (CDT) including manual lymphatic drainage (MLD) technique, short-stretch textile bandages/compression system to decongest limb, and kinesiotaping as appropriate. Patient will receive instruction in proper skin  care to recognize signs/symptoms of and prevent infection, therapeutic exercise, and self-MLD for independent home program and restorative lymphatic performance. Patient verbalizes understanding of plan of care at this time. Patient requires custom flat knit thigh high compression stockings at this time, with patient's lymphedema poorly managed with wear of OTC pantyhose at this time. Patient requires increased containment of lymphedema that will be provided by custom flat knit compression stockings to return to hobby/business of cake baking.     Evaluation Complexity:  History:  MEDIUM  Complexity : 1-2 comorbidities / personal factors will impact the outcome/ POC ; Examination:  MEDIUM Complexity : 3 Standardized tests and measures addressin

## 2023-12-19 ENCOUNTER — APPOINTMENT (OUTPATIENT)
Facility: HOSPITAL | Age: 33
End: 2023-12-19
Payer: OTHER GOVERNMENT

## 2024-01-12 ENCOUNTER — APPOINTMENT (OUTPATIENT)
Facility: HOSPITAL | Age: 34
End: 2024-01-12
Payer: OTHER GOVERNMENT

## 2024-01-24 ENCOUNTER — APPOINTMENT (OUTPATIENT)
Facility: HOSPITAL | Age: 34
End: 2024-01-24
Payer: OTHER GOVERNMENT

## 2024-02-14 ENCOUNTER — HOSPITAL ENCOUNTER (OUTPATIENT)
Facility: HOSPITAL | Age: 34
Setting detail: RECURRING SERIES
Discharge: HOME | End: 2024-02-17
Payer: OTHER GOVERNMENT

## 2024-02-14 PROCEDURE — 97763 ORTHC/PROSTC MGMT SBSQ ENC: CPT

## 2024-02-14 NOTE — THERAPY EVALUATION
[facility]   a part of [facility]  [address], [address]   [place] [address]                                                    Phone:[phone]   Fax:[phone]                                                                                 PHYSICAL THERAPY LYMPHEDEMA - RE-EVALUATION/UPDATED PLAN OF CARE NOTE (updated 3/23)      Date: 2024          Patient Name:  Kemi Falcon :  1990   Medical   Diagnosis:  Lymphedema, not elsewhere classified [I89.0] Treatment Diagnosis:  Q82.0     Hereditary lymphedema    Referral Source:  Damir Magana D* Provider #:  [id number]                Insurance: Payor: [employer] / Plan: [employer] / Product Type: *No Product type* /      Patient  verified yes     Visit #   Current  / Total 2 12   Time   In / Out 0950 1031   Total Treatment Time 41   Total Timed Codes 41         SUBJECTIVE  Pain Level (0-10 scale): 5/10, bilateral LE, heaviness, tightness, increase in size bilateral LE.  [x]constant []intermittent []improving [x]worsening []no change since onset    Any medication changes, allergies to medications, adverse drug reactions, diagnosis change, or new procedure performed?: [x] No    [] Yes (see summary sheet for update)  Medications: Verified on Patient Summary List    Subjective functional status/changes:     Patient reporting she received her garments, and she is ready to try them on. She is worried they are too short.    FROM IE:  Start of Care: 2024  Onset Date: , 2011  Current symptoms/Complaints: bilateral LE pain, heaviness, skin tightness, increase in limb size, all resulting in negative impact in body image, socializing with others, limiting routine/work/leisure activities, difficulty finding foot wear that fits normally, with feelings of frustration regarding lymphedema reported.  Mechanism of Injury: presentation consistent with primary lymphedema.  PLOF:

## 2024-09-22 ENCOUNTER — HOSPITAL ENCOUNTER (EMERGENCY)
Facility: HOSPITAL | Age: 34
Discharge: HOME | End: 2024-09-22
Payer: OTHER GOVERNMENT

## 2024-09-22 VITALS
SYSTOLIC BLOOD PRESSURE: 120 MMHG | TEMPERATURE: 97.8 F | RESPIRATION RATE: 18 BRPM | OXYGEN SATURATION: 100 % | HEIGHT: 63 IN | HEART RATE: 99 BPM | BODY MASS INDEX: 32.07 KG/M2 | WEIGHT: 181 LBS | DIASTOLIC BLOOD PRESSURE: 85 MMHG

## 2024-09-22 DIAGNOSIS — H11.151 PINGUECULA OF RIGHT EYE: Primary | ICD-10-CM

## 2024-09-22 PROCEDURE — 99283 EMERGENCY DEPT VISIT LOW MDM: CPT

## 2024-09-22 PROCEDURE — 6370000000 HC RX 637 (ALT 250 FOR IP): Performed by: PHYSICIAN ASSISTANT

## 2024-09-22 RX ORDER — TETRACAINE HYDROCHLORIDE 5 MG/ML
1 SOLUTION OPHTHALMIC
Status: DISCONTINUED | OUTPATIENT
Start: 2024-09-22 | End: 2024-09-22

## 2024-09-22 RX ORDER — ERYTHROMYCIN 5 MG/G
OINTMENT OPHTHALMIC
Qty: 1 G | Refills: 0 | Status: SHIPPED | OUTPATIENT
Start: 2024-09-22

## 2024-09-22 RX ORDER — TETRACAINE HYDROCHLORIDE 5 MG/ML
1 SOLUTION OPHTHALMIC
Status: COMPLETED | OUTPATIENT
Start: 2024-09-22 | End: 2024-09-22

## 2024-09-22 RX ADMIN — TETRACAINE HYDROCHLORIDE 1 DROP: 5 SOLUTION OPHTHALMIC at 10:58

## 2024-09-22 RX ADMIN — FLUORESCEIN SODIUM 1 MG: 1 STRIP OPHTHALMIC at 10:58

## 2024-09-22 ASSESSMENT — VISUAL ACUITY
OD: 20/20
OU: 20/15
OS: 20/20

## 2024-09-22 ASSESSMENT — PAIN - FUNCTIONAL ASSESSMENT: PAIN_FUNCTIONAL_ASSESSMENT: NONE - DENIES PAIN
